# Patient Record
Sex: FEMALE | Race: BLACK OR AFRICAN AMERICAN | NOT HISPANIC OR LATINO
[De-identification: names, ages, dates, MRNs, and addresses within clinical notes are randomized per-mention and may not be internally consistent; named-entity substitution may affect disease eponyms.]

---

## 2017-04-11 ENCOUNTER — APPOINTMENT (OUTPATIENT)
Dept: ULTRASOUND IMAGING | Facility: IMAGING CENTER | Age: 72
End: 2017-04-11

## 2017-04-11 ENCOUNTER — OUTPATIENT (OUTPATIENT)
Dept: OUTPATIENT SERVICES | Facility: HOSPITAL | Age: 72
LOS: 1 days | End: 2017-04-11
Payer: MEDICARE

## 2017-04-11 ENCOUNTER — APPOINTMENT (OUTPATIENT)
Dept: MAMMOGRAPHY | Facility: IMAGING CENTER | Age: 72
End: 2017-04-11

## 2017-04-11 DIAGNOSIS — Z00.8 ENCOUNTER FOR OTHER GENERAL EXAMINATION: ICD-10-CM

## 2017-04-11 DIAGNOSIS — Z90.11 ACQUIRED ABSENCE OF RIGHT BREAST AND NIPPLE: Chronic | ICD-10-CM

## 2017-04-11 PROCEDURE — 76642 ULTRASOUND BREAST LIMITED: CPT

## 2017-04-11 PROCEDURE — 77065 DX MAMMO INCL CAD UNI: CPT

## 2017-04-11 PROCEDURE — G0279: CPT

## 2018-01-16 ENCOUNTER — INPATIENT (INPATIENT)
Facility: HOSPITAL | Age: 73
LOS: 2 days | Discharge: HOME | End: 2018-01-19
Attending: INTERNAL MEDICINE | Admitting: INTERNAL MEDICINE
Payer: MEDICARE

## 2018-01-16 VITALS
HEART RATE: 133 BPM | TEMPERATURE: 99 F | SYSTOLIC BLOOD PRESSURE: 120 MMHG | RESPIRATION RATE: 16 BRPM | DIASTOLIC BLOOD PRESSURE: 75 MMHG | OXYGEN SATURATION: 100 %

## 2018-01-16 DIAGNOSIS — I10 ESSENTIAL (PRIMARY) HYPERTENSION: ICD-10-CM

## 2018-01-16 DIAGNOSIS — Z29.9 ENCOUNTER FOR PROPHYLACTIC MEASURES, UNSPECIFIED: ICD-10-CM

## 2018-01-16 DIAGNOSIS — A41.9 SEPSIS, UNSPECIFIED ORGANISM: ICD-10-CM

## 2018-01-16 DIAGNOSIS — Z90.11 ACQUIRED ABSENCE OF RIGHT BREAST AND NIPPLE: Chronic | ICD-10-CM

## 2018-01-16 LAB
ALBUMIN SERPL ELPH-MCNC: 3.1 G/DL — LOW (ref 3.3–5)
ALP SERPL-CCNC: 394 U/L — HIGH (ref 40–120)
ALT FLD-CCNC: 46 U/L — HIGH (ref 4–33)
APPEARANCE UR: SIGNIFICANT CHANGE UP
AST SERPL-CCNC: 274 U/L — HIGH (ref 4–32)
B PERT DNA SPEC QL NAA+PROBE: SIGNIFICANT CHANGE UP
BACTERIA # UR AUTO: SIGNIFICANT CHANGE UP
BASE EXCESS BLDV CALC-SCNC: 1.3 MMOL/L — SIGNIFICANT CHANGE UP
BASOPHILS NFR BLD AUTO: 0.1 % — SIGNIFICANT CHANGE UP (ref 0–2)
BASOPHILS NFR SPEC: 0 % — SIGNIFICANT CHANGE UP (ref 0–2)
BILIRUB SERPL-MCNC: 1.8 MG/DL — HIGH (ref 0.2–1.2)
BILIRUB UR-MCNC: SIGNIFICANT CHANGE UP
BLOOD GAS VENOUS - CREATININE: 0.94 MG/DL — SIGNIFICANT CHANGE UP (ref 0.5–1.3)
BLOOD UR QL VISUAL: NEGATIVE — SIGNIFICANT CHANGE UP
BUN SERPL-MCNC: 18 MG/DL — SIGNIFICANT CHANGE UP (ref 7–23)
C PNEUM DNA SPEC QL NAA+PROBE: NOT DETECTED — SIGNIFICANT CHANGE UP
CALCIUM SERPL-MCNC: 9.6 MG/DL — SIGNIFICANT CHANGE UP (ref 8.4–10.5)
CHLORIDE BLDV-SCNC: 99 MMOL/L — SIGNIFICANT CHANGE UP (ref 96–108)
CHLORIDE SERPL-SCNC: 94 MMOL/L — LOW (ref 98–107)
CO2 SERPL-SCNC: 25 MMOL/L — SIGNIFICANT CHANGE UP (ref 22–31)
COLOR SPEC: YELLOW — SIGNIFICANT CHANGE UP
CREAT SERPL-MCNC: 1.03 MG/DL — SIGNIFICANT CHANGE UP (ref 0.5–1.3)
EOSINOPHIL # BLD AUTO: 0 K/UL — SIGNIFICANT CHANGE UP (ref 0–0.5)
EOSINOPHIL NFR BLD AUTO: 0 % — SIGNIFICANT CHANGE UP (ref 0–6)
EOSINOPHIL NFR FLD: 0 % — SIGNIFICANT CHANGE UP (ref 0–6)
FLUAV H1 2009 PAND RNA SPEC QL NAA+PROBE: NOT DETECTED — SIGNIFICANT CHANGE UP
FLUAV H1 RNA SPEC QL NAA+PROBE: NOT DETECTED — SIGNIFICANT CHANGE UP
FLUAV H3 RNA SPEC QL NAA+PROBE: NOT DETECTED — SIGNIFICANT CHANGE UP
FLUAV SUBTYP SPEC NAA+PROBE: SIGNIFICANT CHANGE UP
FLUBV RNA SPEC QL NAA+PROBE: NOT DETECTED — SIGNIFICANT CHANGE UP
GAS PNL BLDV: 127 MMOL/L — LOW (ref 136–146)
GIANT PLATELETS BLD QL SMEAR: PRESENT — SIGNIFICANT CHANGE UP
GLUCOSE BLDV-MCNC: 97 — SIGNIFICANT CHANGE UP (ref 70–99)
GLUCOSE SERPL-MCNC: 94 MG/DL — SIGNIFICANT CHANGE UP (ref 70–99)
GLUCOSE UR-MCNC: NEGATIVE — SIGNIFICANT CHANGE UP
HADV DNA SPEC QL NAA+PROBE: NOT DETECTED — SIGNIFICANT CHANGE UP
HCO3 BLDV-SCNC: 26 MMOL/L — SIGNIFICANT CHANGE UP (ref 20–27)
HCOV 229E RNA SPEC QL NAA+PROBE: NOT DETECTED — SIGNIFICANT CHANGE UP
HCOV HKU1 RNA SPEC QL NAA+PROBE: NOT DETECTED — SIGNIFICANT CHANGE UP
HCOV NL63 RNA SPEC QL NAA+PROBE: NOT DETECTED — SIGNIFICANT CHANGE UP
HCOV OC43 RNA SPEC QL NAA+PROBE: NOT DETECTED — SIGNIFICANT CHANGE UP
HCT VFR BLD CALC: 33.8 % — LOW (ref 34.5–45)
HCT VFR BLDV CALC: 35 % — SIGNIFICANT CHANGE UP (ref 34.5–45)
HGB BLD-MCNC: 11 G/DL — LOW (ref 11.5–15.5)
HGB BLDV-MCNC: 11.4 G/DL — LOW (ref 11.5–15.5)
HMPV RNA SPEC QL NAA+PROBE: NOT DETECTED — SIGNIFICANT CHANGE UP
HPIV1 RNA SPEC QL NAA+PROBE: NOT DETECTED — SIGNIFICANT CHANGE UP
HPIV2 RNA SPEC QL NAA+PROBE: NOT DETECTED — SIGNIFICANT CHANGE UP
HPIV3 RNA SPEC QL NAA+PROBE: NOT DETECTED — SIGNIFICANT CHANGE UP
HPIV4 RNA SPEC QL NAA+PROBE: NOT DETECTED — SIGNIFICANT CHANGE UP
HYALINE CASTS # UR AUTO: SIGNIFICANT CHANGE UP (ref 0–?)
HYPOCHROMIA BLD QL: SLIGHT — SIGNIFICANT CHANGE UP
IMM GRANULOCYTES # BLD AUTO: 0.31 # — SIGNIFICANT CHANGE UP
IMM GRANULOCYTES NFR BLD AUTO: 1.1 % — SIGNIFICANT CHANGE UP (ref 0–1.5)
KETONES UR-MCNC: SIGNIFICANT CHANGE UP
LACTATE BLDV-MCNC: 2 MMOL/L — SIGNIFICANT CHANGE UP (ref 0.5–2)
LEUKOCYTE ESTERASE UR-ACNC: HIGH
LYMPHOCYTES # BLD AUTO: 1.68 K/UL — SIGNIFICANT CHANGE UP (ref 1–3.3)
LYMPHOCYTES # BLD AUTO: 5.9 % — LOW (ref 13–44)
LYMPHOCYTES NFR SPEC AUTO: 4.5 % — LOW (ref 13–44)
M PNEUMO DNA SPEC QL NAA+PROBE: NOT DETECTED — SIGNIFICANT CHANGE UP
MCHC RBC-ENTMCNC: 26.3 PG — LOW (ref 27–34)
MCHC RBC-ENTMCNC: 32.5 % — SIGNIFICANT CHANGE UP (ref 32–36)
MCV RBC AUTO: 80.9 FL — SIGNIFICANT CHANGE UP (ref 80–100)
MICROCYTES BLD QL: SIGNIFICANT CHANGE UP
MONOCYTES # BLD AUTO: 2.27 K/UL — HIGH (ref 0–0.9)
MONOCYTES NFR BLD AUTO: 8 % — SIGNIFICANT CHANGE UP (ref 2–14)
MONOCYTES NFR BLD: 5.4 % — SIGNIFICANT CHANGE UP (ref 2–9)
MUCOUS THREADS # UR AUTO: SIGNIFICANT CHANGE UP
NEUTROPHIL AB SER-ACNC: 82.1 % — HIGH (ref 43–77)
NEUTROPHILS # BLD AUTO: 24.07 K/UL — HIGH (ref 1.8–7.4)
NEUTROPHILS NFR BLD AUTO: 84.9 % — HIGH (ref 43–77)
NEUTS BAND # BLD: 7.1 % — HIGH (ref 0–6)
NITRITE UR-MCNC: NEGATIVE — SIGNIFICANT CHANGE UP
PCO2 BLDV: 36 MMHG — LOW (ref 41–51)
PH BLDV: 7.46 PH — HIGH (ref 7.32–7.43)
PH UR: 6 — SIGNIFICANT CHANGE UP (ref 4.6–8)
PLATELET # BLD AUTO: 410 K/UL — HIGH (ref 150–400)
PLATELET COUNT - ESTIMATE: NORMAL — SIGNIFICANT CHANGE UP
PMV BLD: 9 FL — SIGNIFICANT CHANGE UP (ref 7–13)
PO2 BLDV: 51 MMHG — HIGH (ref 35–40)
POTASSIUM BLDV-SCNC: 4.1 MMOL/L — SIGNIFICANT CHANGE UP (ref 3.4–4.5)
POTASSIUM SERPL-MCNC: 4.2 MMOL/L — SIGNIFICANT CHANGE UP (ref 3.5–5.3)
POTASSIUM SERPL-SCNC: 4.2 MMOL/L — SIGNIFICANT CHANGE UP (ref 3.5–5.3)
PROT SERPL-MCNC: 8.1 G/DL — SIGNIFICANT CHANGE UP (ref 6–8.3)
PROT UR-MCNC: 100 MG/DL — HIGH
RBC # BLD: 4.18 M/UL — SIGNIFICANT CHANGE UP (ref 3.8–5.2)
RBC # FLD: 15.8 % — HIGH (ref 10.3–14.5)
RBC CASTS # UR COMP ASSIST: SIGNIFICANT CHANGE UP (ref 0–?)
RSV RNA SPEC QL NAA+PROBE: NOT DETECTED — SIGNIFICANT CHANGE UP
RV+EV RNA SPEC QL NAA+PROBE: NOT DETECTED — SIGNIFICANT CHANGE UP
SAO2 % BLDV: 84.6 % — SIGNIFICANT CHANGE UP (ref 60–85)
SODIUM SERPL-SCNC: 132 MMOL/L — LOW (ref 135–145)
SP GR SPEC: 1.02 — SIGNIFICANT CHANGE UP (ref 1–1.04)
SQUAMOUS # UR AUTO: SIGNIFICANT CHANGE UP
UROBILINOGEN FLD QL: 2 MG/DL — HIGH
VARIANT LYMPHS # BLD: 0.9 % — SIGNIFICANT CHANGE UP
WBC # BLD: 28.37 K/UL — HIGH (ref 3.8–10.5)
WBC # FLD AUTO: 28.37 K/UL — HIGH (ref 3.8–10.5)
WBC UR QL: HIGH (ref 0–?)

## 2018-01-16 PROCEDURE — 99223 1ST HOSP IP/OBS HIGH 75: CPT | Mod: GC

## 2018-01-16 PROCEDURE — 71046 X-RAY EXAM CHEST 2 VIEWS: CPT | Mod: 26

## 2018-01-16 PROCEDURE — 71250 CT THORAX DX C-: CPT | Mod: 26

## 2018-01-16 RX ORDER — AZITHROMYCIN 500 MG/1
500 TABLET, FILM COATED ORAL ONCE
Qty: 0 | Refills: 0 | Status: COMPLETED | OUTPATIENT
Start: 2018-01-16 | End: 2018-01-16

## 2018-01-16 RX ORDER — CEFTRIAXONE 500 MG/1
1 INJECTION, POWDER, FOR SOLUTION INTRAMUSCULAR; INTRAVENOUS EVERY 24 HOURS
Qty: 0 | Refills: 0 | Status: DISCONTINUED | OUTPATIENT
Start: 2018-01-16 | End: 2018-01-18

## 2018-01-16 RX ORDER — CEFTRIAXONE 500 MG/1
1 INJECTION, POWDER, FOR SOLUTION INTRAMUSCULAR; INTRAVENOUS ONCE
Qty: 0 | Refills: 0 | Status: COMPLETED | OUTPATIENT
Start: 2018-01-16 | End: 2018-01-16

## 2018-01-16 RX ORDER — ACETAMINOPHEN 500 MG
650 TABLET ORAL ONCE
Qty: 0 | Refills: 0 | Status: DISCONTINUED | OUTPATIENT
Start: 2018-01-16 | End: 2018-01-16

## 2018-01-16 RX ORDER — AZITHROMYCIN 500 MG/1
500 TABLET, FILM COATED ORAL EVERY 24 HOURS
Qty: 0 | Refills: 0 | Status: DISCONTINUED | OUTPATIENT
Start: 2018-01-16 | End: 2018-01-18

## 2018-01-16 RX ORDER — LISINOPRIL 2.5 MG/1
20 TABLET ORAL DAILY
Qty: 0 | Refills: 0 | Status: DISCONTINUED | OUTPATIENT
Start: 2018-01-16 | End: 2018-01-16

## 2018-01-16 RX ORDER — HEPARIN SODIUM 5000 [USP'U]/ML
5000 INJECTION INTRAVENOUS; SUBCUTANEOUS EVERY 8 HOURS
Qty: 0 | Refills: 0 | Status: DISCONTINUED | OUTPATIENT
Start: 2018-01-16 | End: 2018-01-19

## 2018-01-16 RX ORDER — ACETAMINOPHEN 500 MG
650 TABLET ORAL ONCE
Qty: 0 | Refills: 0 | Status: COMPLETED | OUTPATIENT
Start: 2018-01-16 | End: 2018-01-16

## 2018-01-16 RX ORDER — SODIUM CHLORIDE 9 MG/ML
2000 INJECTION INTRAMUSCULAR; INTRAVENOUS; SUBCUTANEOUS ONCE
Qty: 0 | Refills: 0 | Status: DISCONTINUED | OUTPATIENT
Start: 2018-01-16 | End: 2018-01-16

## 2018-01-16 RX ORDER — SODIUM CHLORIDE 9 MG/ML
1000 INJECTION INTRAMUSCULAR; INTRAVENOUS; SUBCUTANEOUS ONCE
Qty: 0 | Refills: 0 | Status: COMPLETED | OUTPATIENT
Start: 2018-01-16 | End: 2018-01-16

## 2018-01-16 RX ORDER — HYDROCHLOROTHIAZIDE 25 MG
25 TABLET ORAL DAILY
Qty: 0 | Refills: 0 | Status: DISCONTINUED | OUTPATIENT
Start: 2018-01-16 | End: 2018-01-16

## 2018-01-16 RX ADMIN — SODIUM CHLORIDE 1000 MILLILITER(S): 9 INJECTION INTRAMUSCULAR; INTRAVENOUS; SUBCUTANEOUS at 11:05

## 2018-01-16 RX ADMIN — Medication 650 MILLIGRAM(S): at 11:21

## 2018-01-16 RX ADMIN — HEPARIN SODIUM 5000 UNIT(S): 5000 INJECTION INTRAVENOUS; SUBCUTANEOUS at 22:18

## 2018-01-16 RX ADMIN — AZITHROMYCIN 250 MILLIGRAM(S): 500 TABLET, FILM COATED ORAL at 13:03

## 2018-01-16 RX ADMIN — CEFTRIAXONE 100 GRAM(S): 500 INJECTION, POWDER, FOR SOLUTION INTRAMUSCULAR; INTRAVENOUS at 11:33

## 2018-01-16 NOTE — H&P ADULT - PROBLEM SELECTOR PLAN 2
Continue lisinopril and HCTZ (home dose)  Diltiazem 60 mg Q8H (takes 180 mg daily slow release at home)

## 2018-01-16 NOTE — H&P ADULT - NSHPPHYSICALEXAM_GEN_ALL_CORE
PHYSICAL EXAM:    Constitutional: No distress, thin  Neck: Supple  Respiratory: CTAB, no wheezing, no use of acc muscles  Cardiovascular: RRR, no murmur, no edema  Gastrointestinal: Soft, nontender  Neurological: A&Ox3, no focal deficits  Skin: Intact  Musculoskeletal: Normal ROM  Psychiatric: Calm, pleasant

## 2018-01-16 NOTE — ED PROVIDER NOTE - MEDICAL DECISION MAKING DETAILS
72F presenting with cough x 4mo with weight loss and night sweat, remote hx of breast CA. DDX: Lung mets vs. PE vs. PNA vs. Reactive airway vs. GERD vs. Allergy. Tachycardia maybe from dehydration. Will check Basics, Gas, NS, Rectal temp, CTA to r/o lung mets/PE.

## 2018-01-16 NOTE — H&P ADULT - ASSESSMENT
72 year old female with hypertension presents with cough, dyspnea, weight loss. She is septic with bandemia. Overall looks nontoxic. Not requiring oxygen at this time. The time course of her symptoms not consistent however with acute infection. Also concerned with 10 lbs weight loss over past four months. Will treat for community acquired pneumonia for now. Should have malignancy work up after acute infection resolves.

## 2018-01-16 NOTE — H&P ADULT - HISTORY OF PRESENT ILLNESS
72 year old female with history of breast cancer s/p mastectomy (2015) and hypertension presents with approximately four months of productive cough with clear sputum and dyspnea. Prior to onset was feeling well, which is her baseline. Patient is normally active, exercises multiple times per week. At time of onset, patient also noted myalgias, fatigue Symptoms initially improved and then worsened. She went to an urgent care center and prescribed abx (unclear which type). Also seen by her PMD. Reports around 10 lbs weight loss due to loss of appetite. Lives at home with daughter and granddaughter. No sick contacts. No chest pain, palpitations, syncope, diarrhea, nausea, melena, or vomiting. No known pulmonary disease. Former smoker (quite > 30 years ago). Given azithromycin and ceftriaxone in ED. Not requiring O2 at this time.

## 2018-01-16 NOTE — H&P ADULT - PROBLEM SELECTOR PLAN 1
Treat for community acquired pneumonia  Continue ceftriaxone and azithromycin  Follow up urinary legionella and cultures

## 2018-01-16 NOTE — ED PROVIDER NOTE - OBJECTIVE STATEMENT
72F presenting with cough and SOB x 4 months. Pt visited urgent s/p Abx tx in Oct, and also visit PMD visit, told it was allergy. Pt endorsed to SOB with cough for past few weeks. Has weight loss (10lb) with night sweats. Loss of appetite with decreased PO intake. No fever or chills. No hx of blood clots, pain in calf, long travel or recent surgery. Pt came today because she had worsening of cough last night with productive clear sputum/ no blood. No chest pain/back pain or abd pain. No nausea or vomiting or diarrhea. 72F presenting with cough and SOB x 4 months. Pt visited urgent s/p Abx tx in Oct, and also visit PMD visit, told it was allergy. Pt endorsed to SOB with cough for past few weeks. Has weight loss (10lb) with night sweats. Loss of appetite with decreased PO intake. No fever or chills. No hx of blood clots, pain in calf, long travel or recent surgery. Pt came today because she had worsening of cough last night with productive clear sputum/ no blood. No chest pain/back pain or abd pain. No nausea or vomiting or diarrhea.  PMD: Dr. Flowers ([employer])

## 2018-01-16 NOTE — ED ADULT NURSE NOTE - OBJECTIVE STATEMENT
Patient presented to ED A&O x4, with c/o SOB and cough for several months with decreased appetite and weight loss of 10 lbs.  Patient denies any CP or fever, (+) rectal temp in ED.  EKG done and cardiac monitor in place.  Blood work drawn and urine specimen obtained and sent to lab. ER physician evaluated patient, orders placed for tx.  Will continue to monitor patient closely. Janice LOUIE.

## 2018-01-16 NOTE — ED ADULT NURSE NOTE - NS ED NURSE PATIENT LEFT UNIT TIME
14:45

## 2018-01-16 NOTE — ED ADULT TRIAGE NOTE - CHIEF COMPLAINT QUOTE
c/o coughing with white sputum, pt also pain to bilat upper quadrant pain worsens with cough. pt sts been feeing like this since october and has been seeing her PMD with no improvement. denies any fevers at home.

## 2018-01-16 NOTE — ED PROVIDER NOTE - ATTENDING CONTRIBUTION TO CARE
72f, pmhx htn, remote breast ca. p/w 6 weeks of cough. cough worsened last night with increased sputum last night so came to ED. no c/p, sob, n/v, f/c. +mild night sweats. with initial cough didn't eat much but since sidney her appetite and po intake has been normal. no new rhinorrhea, sore throat, body aches or h/a. exam, tachy 124, febrile rectal 102 other vs wnl, nad, no resp distress. hs normal, lungs clear, abdo benign, pulses normal. likely pna, will get labs, cxr, treat as cap, rvp, IVF. admit.

## 2018-01-16 NOTE — H&P ADULT - NSHPLABSRESULTS_GEN_ALL_CORE
11.0   28.37 )-----------( 410      ( 16 Jan 2018 11:00 )             33.8     01-16    132<L>  |  94<L>  |  18  ----------------------------<  94  4.2   |  25  |  1.03    Ca    9.6      16 Jan 2018 10:58    TPro  8.1  /  Alb  3.1<L>  /  TBili  1.8<H>  /  DBili  x   /  AST  274<H>  /  ALT  46<H>  /  AlkPhos  394<H>  01-16      Xray Chest 2 Views PA/Lat (01.16.18 @ 12:10) >    Slight right hemidiaphragm elevation.    Small focus of linear subsegmental atelectasis or scarring in left lung   base. Clear remaining visualized lungs. No pleural effusions or   pneumothorax.    Scant aortic arch calcifications. Otherwise cardiac and mediastinal   silhouettes within normal limits.    Trachea midline.    Generalized mild osteopenia. Mild spinal degenerative changes.    Asymmetric breast shadows with right appearing more receded and with   appearance of a right chest wall breast implant.

## 2018-01-17 DIAGNOSIS — K76.9 LIVER DISEASE, UNSPECIFIED: ICD-10-CM

## 2018-01-17 DIAGNOSIS — R06.02 SHORTNESS OF BREATH: ICD-10-CM

## 2018-01-17 LAB
BASOPHILS # BLD AUTO: 0.03 K/UL — SIGNIFICANT CHANGE UP (ref 0–0.2)
BASOPHILS NFR BLD AUTO: 0.1 % — SIGNIFICANT CHANGE UP (ref 0–2)
BUN SERPL-MCNC: 26 MG/DL — HIGH (ref 7–23)
CALCIUM SERPL-MCNC: 8.9 MG/DL — SIGNIFICANT CHANGE UP (ref 8.4–10.5)
CHLORIDE SERPL-SCNC: 93 MMOL/L — LOW (ref 98–107)
CO2 SERPL-SCNC: 25 MMOL/L — SIGNIFICANT CHANGE UP (ref 22–31)
CREAT SERPL-MCNC: 0.99 MG/DL — SIGNIFICANT CHANGE UP (ref 0.5–1.3)
EOSINOPHIL # BLD AUTO: 0.01 K/UL — SIGNIFICANT CHANGE UP (ref 0–0.5)
EOSINOPHIL NFR BLD AUTO: 0 % — SIGNIFICANT CHANGE UP (ref 0–6)
GLUCOSE SERPL-MCNC: 114 MG/DL — HIGH (ref 70–99)
HCT VFR BLD CALC: 29 % — LOW (ref 34.5–45)
HGB BLD-MCNC: 9.2 G/DL — LOW (ref 11.5–15.5)
IMM GRANULOCYTES # BLD AUTO: 0.2 # — SIGNIFICANT CHANGE UP
IMM GRANULOCYTES NFR BLD AUTO: 1 % — SIGNIFICANT CHANGE UP (ref 0–1.5)
LYMPHOCYTES # BLD AUTO: 2.05 K/UL — SIGNIFICANT CHANGE UP (ref 1–3.3)
LYMPHOCYTES # BLD AUTO: 9.8 % — LOW (ref 13–44)
MAGNESIUM SERPL-MCNC: 1.8 MG/DL — SIGNIFICANT CHANGE UP (ref 1.6–2.6)
MCHC RBC-ENTMCNC: 25.7 PG — LOW (ref 27–34)
MCHC RBC-ENTMCNC: 31.7 % — LOW (ref 32–36)
MCV RBC AUTO: 81 FL — SIGNIFICANT CHANGE UP (ref 80–100)
MONOCYTES # BLD AUTO: 2.07 K/UL — HIGH (ref 0–0.9)
MONOCYTES NFR BLD AUTO: 9.9 % — SIGNIFICANT CHANGE UP (ref 2–14)
NEUTROPHILS # BLD AUTO: 16.55 K/UL — HIGH (ref 1.8–7.4)
NEUTROPHILS NFR BLD AUTO: 79.2 % — HIGH (ref 43–77)
NRBC # FLD: 0 — SIGNIFICANT CHANGE UP
PHOSPHATE SERPL-MCNC: 3.2 MG/DL — SIGNIFICANT CHANGE UP (ref 2.5–4.5)
PLATELET # BLD AUTO: 347 K/UL — SIGNIFICANT CHANGE UP (ref 150–400)
PMV BLD: 9.8 FL — SIGNIFICANT CHANGE UP (ref 7–13)
POTASSIUM SERPL-MCNC: 4.8 MMOL/L — SIGNIFICANT CHANGE UP (ref 3.5–5.3)
POTASSIUM SERPL-SCNC: 4.8 MMOL/L — SIGNIFICANT CHANGE UP (ref 3.5–5.3)
RBC # BLD: 3.58 M/UL — LOW (ref 3.8–5.2)
RBC # FLD: 15.7 % — HIGH (ref 10.3–14.5)
SODIUM SERPL-SCNC: 130 MMOL/L — LOW (ref 135–145)
SPECIMEN SOURCE: SIGNIFICANT CHANGE UP
WBC # BLD: 20.91 K/UL — HIGH (ref 3.8–10.5)
WBC # FLD AUTO: 20.91 K/UL — HIGH (ref 3.8–10.5)

## 2018-01-17 PROCEDURE — 93010 ELECTROCARDIOGRAM REPORT: CPT

## 2018-01-17 PROCEDURE — 99233 SBSQ HOSP IP/OBS HIGH 50: CPT | Mod: GC

## 2018-01-17 RX ORDER — IBUPROFEN 200 MG
400 TABLET ORAL
Qty: 0 | Refills: 0 | Status: DISCONTINUED | OUTPATIENT
Start: 2018-01-17 | End: 2018-01-19

## 2018-01-17 RX ORDER — CHLORHEXIDINE GLUCONATE 213 G/1000ML
1 SOLUTION TOPICAL DAILY
Qty: 0 | Refills: 0 | Status: DISCONTINUED | OUTPATIENT
Start: 2018-01-17 | End: 2018-01-19

## 2018-01-17 RX ORDER — SODIUM CHLORIDE 9 MG/ML
1000 INJECTION INTRAMUSCULAR; INTRAVENOUS; SUBCUTANEOUS
Qty: 0 | Refills: 0 | Status: DISCONTINUED | OUTPATIENT
Start: 2018-01-17 | End: 2018-01-19

## 2018-01-17 RX ADMIN — Medication 400 MILLIGRAM(S): at 22:15

## 2018-01-17 RX ADMIN — Medication 400 MILLIGRAM(S): at 21:45

## 2018-01-17 RX ADMIN — AZITHROMYCIN 250 MILLIGRAM(S): 500 TABLET, FILM COATED ORAL at 13:00

## 2018-01-17 RX ADMIN — HEPARIN SODIUM 5000 UNIT(S): 5000 INJECTION INTRAVENOUS; SUBCUTANEOUS at 05:42

## 2018-01-17 RX ADMIN — HEPARIN SODIUM 5000 UNIT(S): 5000 INJECTION INTRAVENOUS; SUBCUTANEOUS at 13:00

## 2018-01-17 RX ADMIN — SODIUM CHLORIDE 250 MILLILITER(S): 9 INJECTION INTRAMUSCULAR; INTRAVENOUS; SUBCUTANEOUS at 14:01

## 2018-01-17 RX ADMIN — HEPARIN SODIUM 5000 UNIT(S): 5000 INJECTION INTRAVENOUS; SUBCUTANEOUS at 21:45

## 2018-01-17 RX ADMIN — Medication 200 MILLIGRAM(S): at 21:56

## 2018-01-17 RX ADMIN — CEFTRIAXONE 100 GRAM(S): 500 INJECTION, POWDER, FOR SOLUTION INTRAMUSCULAR; INTRAVENOUS at 11:22

## 2018-01-17 NOTE — CHART NOTE - NSCHARTNOTEFT_GEN_A_CORE
Called Dr. Montemayor's office at [phone] for collateral information re: pt's breast cancer. Spoke with Dr. Goodwin, as Dr. Montemayor is not in office until Friday. Dr. Goodwin is not familiar with the patient, but was able to read some notes.  According to Dr. Goodwin, pt had right mastectomy in 2003. Pt has been with known liver mets since 2014. Was last on Kadcylla in 2015, but off chemo at this time. States there have not been any recent CT or PET scans. States he does not know the patient and cannot add any context - will try to reach out to Dr. Montemayor again when she is in on Friday if necessary.  Spoke with patient - patient states she is no longer on any chemo medications but when questioned about her liver lesions since 2014 pt states she was told she had elevated liver enzymes. Pt is not aware of any liver lesions. States she sees Dr. Montemayor for her breast cancer and follows her for that. States she has an appointment this Friday to see her.  Pt is pending CT A/P for further eval.

## 2018-01-17 NOTE — PROGRESS NOTE ADULT - SUBJECTIVE AND OBJECTIVE BOX
CHIEF COMPLAINT: Patient is a 72y old  Female who presents with a chief complaint of Cough and SOB x six weeks (2018 14:05)    Interval Events:      REVIEW OF SYSTEMS:  Constitutional:   Eyes:  ENT:  CV:  Resp:  GI:  :  MSK:  Integumentary:  Neurological:  Psychiatric:  Endocrine:  Hematologic/Lymphatic:  Allergic/Immunologic:  [ ] All other systems negative  [ ] Unable to assess ROS because ________      OBJECTIVE:  ICU Vital Signs Last 24 Hrs  T(C): 37.1 (2018 05:41), Max: 39 (2018 11:07)  T(F): 98.8 (2018 05:41), Max: 102.2 (2018 11:07)  HR: 100 (2018 05:41) (82 - 133)  BP: 107/70 (2018 05:41) (106/63 - 126/69)  BP(mean): --  ABP: --  ABP(mean): --  RR: 18 (2018 05:41) (16 - 28)  SpO2: 100% (2018 05:41) (100% - 100%)     @ 07:01  -  -17 @ 07:00  --------------------------------------------------------  IN: 0 mL / OUT: 1400 mL / NET: -1400 mL      HOSPITAL MEDICATIONS:  MEDICATIONS  (STANDING):  azithromycin  IVPB 500 milliGRAM(s) IV Intermittent every 24 hours  cefTRIAXone   IVPB 1 Gram(s) IV Intermittent every 24 hours  heparin  Injectable 5000 Unit(s) SubCutaneous every 8 hours    MEDICATIONS  (PRN):      LABS:                        9.2    20.91 )-----------( 347      ( 2018 06:00 )             29.0         130<L>  |  93<L>  |  26<H>  ----------------------------<  114<H>  4.8   |  25  |  0.99    Ca    8.9      2018 06:00  Phos  3.2     -17  Mg     1.8         TPro  8.1  /  Alb  3.1<L>  /  TBili  1.8<H>  /  DBili  x   /  AST  274<H>  /  ALT  46<H>  /  AlkPhos  394<H>        Urinalysis Basic - ( 2018 11:00 )    Color: YELLOW / Appearance: HAZY / S.022 / pH: 6.0  Gluc: NEGATIVE / Ketone: TRACE  / Bili: SMALL / Urobili: 2 mg/dL   Blood: NEGATIVE / Protein: 100 mg/dL / Nitrite: NEGATIVE   Leuk Esterase: SMALL / RBC: 0-2 / WBC 5-10   Sq Epi: MOD / Non Sq Epi: x / Bacteria: FEW        Venous Blood Gas:   @ 10:58  7.46/36/51/26/84.6  VBG Lactate: 2.0      MICROBIOLOGY:     RADIOLOGY:  [ ] Reviewed and interpreted by me    PULMONARY FUNCTION TESTS:    EKG: CHIEF COMPLAINT: Patient is a 72y old  Female who presents with a chief complaint of Cough and SOB x six weeks (16 Jan 2018 14:05)    Interval Events: none overnight      REVIEW OF SYSTEMS:  Constitutional: feels much better  CV: denies  Resp: denies  GI: denies  [x] All other systems negative  [ ] Unable to assess ROS because ________      OBJECTIVE:  ICU Vital Signs Last 24 Hrs  T(C): 37.1 (17 Jan 2018 05:41), Max: 39 (16 Jan 2018 11:07)  T(F): 98.8 (17 Jan 2018 05:41), Max: 102.2 (16 Jan 2018 11:07)  HR: 100 (17 Jan 2018 05:41) (82 - 133)  BP: 107/70 (17 Jan 2018 05:41) (106/63 - 126/69)  BP(mean): --  ABP: --  ABP(mean): --  RR: 18 (17 Jan 2018 05:41) (16 - 28)  SpO2: 100% (17 Jan 2018 05:41) (100% - 100%)    01-16 @ 07:01  -  01-17 @ 07:00  --------------------------------------------------------  IN: 0 mL / OUT: 1400 mL / NET: -1400 mL      HOSPITAL MEDICATIONS:  MEDICATIONS  (STANDING):  azithromycin  IVPB 500 milliGRAM(s) IV Intermittent every 24 hours  cefTRIAXone   IVPB 1 Gram(s) IV Intermittent every 24 hours  heparin  Injectable 5000 Unit(s) SubCutaneous every 8 hours    MEDICATIONS  (PRN):      LABS:                        9.2    20.91 )-----------( 347      ( 17 Jan 2018 06:00 )             29.0     01-17    130<L>  |  93<L>  |  26<H>  ----------------------------<  114<H>  4.8   |  25  |  0.99    Ca    8.9      17 Jan 2018 06:00  Phos  3.2     01-17  Mg     1.8     01-17

## 2018-01-17 NOTE — CHART NOTE - NSCHARTNOTEFT_GEN_A_CORE
ADS    Called to evaluate this 72 year old female patient who is here for productive cough with clear sputum and dyspnea for a .  STAT EKG ordered. ADS    Called to evaluate this 72 year old female patient who is here for productive cough with clear sputum and dyspnea for a .  STAT EKG ordered.  Pt denied chest pain, -SOB.  Pt in no acute distress. EKG - sinus tachycardic.  Will continue to monitor.     1/18 - 0030 -  . Pt sleeping and resting comfortably in no distress.      Mari MIRELES ACNP

## 2018-01-18 ENCOUNTER — TRANSCRIPTION ENCOUNTER (OUTPATIENT)
Age: 73
End: 2018-01-18

## 2018-01-18 LAB
BACTERIA UR CULT: SIGNIFICANT CHANGE UP
BUN SERPL-MCNC: 31 MG/DL — HIGH (ref 7–23)
CALCIUM SERPL-MCNC: 8.9 MG/DL — SIGNIFICANT CHANGE UP (ref 8.4–10.5)
CHLORIDE SERPL-SCNC: 94 MMOL/L — LOW (ref 98–107)
CO2 SERPL-SCNC: 19 MMOL/L — LOW (ref 22–31)
CREAT SERPL-MCNC: 0.98 MG/DL — SIGNIFICANT CHANGE UP (ref 0.5–1.3)
GLUCOSE SERPL-MCNC: 73 MG/DL — SIGNIFICANT CHANGE UP (ref 70–99)
HCT VFR BLD CALC: 28.9 % — LOW (ref 34.5–45)
HGB BLD-MCNC: 9.7 G/DL — LOW (ref 11.5–15.5)
L PNEUMO AG UR QL: NEGATIVE — SIGNIFICANT CHANGE UP
MCHC RBC-ENTMCNC: 27.2 PG — SIGNIFICANT CHANGE UP (ref 27–34)
MCHC RBC-ENTMCNC: 33.6 % — SIGNIFICANT CHANGE UP (ref 32–36)
MCV RBC AUTO: 81.2 FL — SIGNIFICANT CHANGE UP (ref 80–100)
NRBC # FLD: 0 — SIGNIFICANT CHANGE UP
PLATELET # BLD AUTO: 335 K/UL — SIGNIFICANT CHANGE UP (ref 150–400)
PMV BLD: 10 FL — SIGNIFICANT CHANGE UP (ref 7–13)
POTASSIUM SERPL-MCNC: 5.2 MMOL/L — SIGNIFICANT CHANGE UP (ref 3.5–5.3)
POTASSIUM SERPL-SCNC: 5.2 MMOL/L — SIGNIFICANT CHANGE UP (ref 3.5–5.3)
RBC # BLD: 3.56 M/UL — LOW (ref 3.8–5.2)
RBC # FLD: 15.9 % — HIGH (ref 10.3–14.5)
SODIUM SERPL-SCNC: 127 MMOL/L — LOW (ref 135–145)
WBC # BLD: 21.23 K/UL — HIGH (ref 3.8–10.5)
WBC # FLD AUTO: 21.23 K/UL — HIGH (ref 3.8–10.5)

## 2018-01-18 PROCEDURE — 93970 EXTREMITY STUDY: CPT | Mod: 26

## 2018-01-18 PROCEDURE — 93010 ELECTROCARDIOGRAM REPORT: CPT

## 2018-01-18 PROCEDURE — 74177 CT ABD & PELVIS W/CONTRAST: CPT | Mod: 26

## 2018-01-18 PROCEDURE — 99233 SBSQ HOSP IP/OBS HIGH 50: CPT | Mod: GC

## 2018-01-18 RX ORDER — SODIUM CHLORIDE 9 MG/ML
1000 INJECTION INTRAMUSCULAR; INTRAVENOUS; SUBCUTANEOUS
Qty: 0 | Refills: 0 | Status: DISCONTINUED | OUTPATIENT
Start: 2018-01-18 | End: 2018-01-19

## 2018-01-18 RX ORDER — IBUPROFEN 200 MG
1 TABLET ORAL
Qty: 0 | Refills: 0 | COMMUNITY
Start: 2018-01-18

## 2018-01-18 RX ADMIN — HEPARIN SODIUM 5000 UNIT(S): 5000 INJECTION INTRAVENOUS; SUBCUTANEOUS at 05:59

## 2018-01-18 RX ADMIN — CEFTRIAXONE 100 GRAM(S): 500 INJECTION, POWDER, FOR SOLUTION INTRAMUSCULAR; INTRAVENOUS at 11:50

## 2018-01-18 RX ADMIN — HEPARIN SODIUM 5000 UNIT(S): 5000 INJECTION INTRAVENOUS; SUBCUTANEOUS at 22:09

## 2018-01-18 RX ADMIN — CHLORHEXIDINE GLUCONATE 1 APPLICATION(S): 213 SOLUTION TOPICAL at 11:50

## 2018-01-18 RX ADMIN — Medication 200 MILLIGRAM(S): at 11:50

## 2018-01-18 RX ADMIN — Medication 400 MILLIGRAM(S): at 22:38

## 2018-01-18 RX ADMIN — SODIUM CHLORIDE 250 MILLILITER(S): 9 INJECTION INTRAMUSCULAR; INTRAVENOUS; SUBCUTANEOUS at 12:54

## 2018-01-18 RX ADMIN — HEPARIN SODIUM 5000 UNIT(S): 5000 INJECTION INTRAVENOUS; SUBCUTANEOUS at 13:00

## 2018-01-18 RX ADMIN — Medication 400 MILLIGRAM(S): at 22:08

## 2018-01-18 RX ADMIN — Medication 200 MILLIGRAM(S): at 22:08

## 2018-01-18 NOTE — DISCHARGE NOTE ADULT - PLAN OF CARE
resolved You were treated with antibiotics while in the hospital.  Please monitor for any further shortness of breath. Please continue follow up with Dr. Montemayor for further treatment and management.  You will likely need further scans to reassess the lesions of the liver, likely metastatic disease. Blood pressure stable off medications.

## 2018-01-18 NOTE — DISCHARGE NOTE ADULT - MEDICATION SUMMARY - MEDICATIONS TO STOP TAKING
I will STOP taking the medications listed below when I get home from the hospital:    hydrochlorothiazide 25 mg oral tablet  -- 1 tab(s) by mouth once a day    lisinopril-hydroCHLOROthiazide 20 mg-25 mg oral tablet  -- 1 tab(s) by mouth once a day

## 2018-01-18 NOTE — CHART NOTE - NSCHARTNOTEFT_GEN_A_CORE
ADS    Called to evaluate this patient for .  EKG - ST.  Pt asymptomatic.  Pt denies chest pain, palpitations, SOB.  Will continue to monitor.      Mari MIRELES NP

## 2018-01-18 NOTE — DISCHARGE NOTE ADULT - CARE PLAN
Principal Discharge DX:	Shortness of breath  Goal:	resolved  Assessment and plan of treatment:	You were treated with antibiotics while in the hospital.  Please monitor for any further shortness of breath.  Secondary Diagnosis:	Breast cancer  Assessment and plan of treatment:	Please continue follow up with Dr. Montemayor for further treatment and management.  You will likely need further scans to reassess the lesions of the liver, likely metastatic disease.  Secondary Diagnosis:	Hypertension  Assessment and plan of treatment:	Blood pressure stable off medications.

## 2018-01-18 NOTE — DISCHARGE NOTE ADULT - HOSPITAL COURSE
72 year old female with history of breast cancer s/p mastectomy (2015) and hypertension presents with approximately four months of productive cough with clear sputum and dyspnea.  CT chest without pna, but pt improved with abx.  Blood cultures, urine cx negative.  CT chest showing liver lesions - CT A/P showing extensive hepatic mets. Per pt's private oncologist, with known mets.  Pt's SOB improved, stable on room air.  Optimized for discharge.  Will follow up outpatient with own oncologist.    dispo: to home

## 2018-01-18 NOTE — DISCHARGE NOTE ADULT - CARE PROVIDER_API CALL
Geetha Montemayor), Hematology; Internal Medicine  [address] [facility]  [address] [place] [address]  Phone: [phone]  Fax: [phone]

## 2018-01-18 NOTE — PROGRESS NOTE ADULT - PROBLEM SELECTOR PLAN 3
- pt with hx of breast cancer, in 2003 per patient, s/p mastectomy, without chemo/radiation  - CT chest with possible liver lesions  - CT A/P pending final read  - see note from yesterday re: conversation with pt's oncologist
- pt with hx of breast cancer, in 2003 per patient, s/p mastectomy, without chemo/radiation  - CT chest with possible liver lesions  - will check CT A/P with and without IV contrast  - will reach out to pt's oncologist, Dr. Montemayor

## 2018-01-18 NOTE — PROGRESS NOTE ADULT - PROBLEM SELECTOR PLAN 1
Notes bulge in RLQ. Exam c/w probable right inguinal hernia. Advised to monitor, and consider referral to general surgery after delivery if persistent. Ultrasound c/w 1.6 cm left cystic structure, ?arising from left kidney. Questionable echogenic bowel does not appear as bright bone. Incomplete heart views. Pt aware low lying placenta. Normal quad screen. Will refer to [facility] to evaluate. Pt and  advised.   
Patient presents for her routine OB visit with anatomy    Patient would like communication of their results via:      Pablo  Patient's current [facility] status: Active.      
- now improved  - stable on room air  - cont to treat for CAP  - cont abx  - legionella pending  - urine culture NTD  - blood culture NTD
- now improved  - stable on room air  - cont to treat for CAP  - cont abx  - legionella pending  - urine culture NTD  - blood culture NTD

## 2018-01-18 NOTE — PROGRESS NOTE ADULT - SUBJECTIVE AND OBJECTIVE BOX
CHIEF COMPLAINT:    Interval Events:    REVIEW OF SYSTEMS:  Constitutional:   Eyes:  ENT:  CV:  Resp:  GI:  :  MSK:  Integumentary:  Neurological:  Psychiatric:  Endocrine:  Hematologic/Lymphatic:  Allergic/Immunologic:  [ ] All other systems negative  [ ] Unable to assess ROS because ________    OBJECTIVE:  ICU Vital Signs Last 24 Hrs  T(C): 36.6 (2018 05:50), Max: 37.5 (2018 21:31)  T(F): 97.9 (2018 05:50), Max: 99.5 (2018 21:31)  HR: 86 (2018 05:50) (86 - 122)  BP: 103/68 (2018 05:50) (103/68 - 120/71)  BP(mean): --  ABP: --  ABP(mean): --  RR: 18 (2018 05:50) (18 - 20)  SpO2: 100% (2018 05:50) (100% - 100%)        CAPILLARY BLOOD GLUCOSE          PHYSICAL EXAM:  General:   HEENT:   Lymph Nodes:  Neck:   Respiratory:   Cardiovascular:   Abdomen:   Extremities:   Skin:   Neurological:  Psychiatry:    HOSPITAL MEDICATIONS:  MEDICATIONS  (STANDING):  azithromycin  IVPB 500 milliGRAM(s) IV Intermittent every 24 hours  cefTRIAXone   IVPB 1 Gram(s) IV Intermittent every 24 hours  chlorhexidine 4% Liquid 1 Application(s) Topical daily  heparin  Injectable 5000 Unit(s) SubCutaneous every 8 hours  sodium chloride 0.9%. 1000 milliLiter(s) (250 mL/Hr) IV Continuous <Continuous>    MEDICATIONS  (PRN):  guaiFENesin    Syrup 200 milliGRAM(s) Oral every 6 hours PRN Cough  ibuprofen  Tablet 400 milliGRAM(s) Oral four times a day PRN moderate and severe pain      LABS:                        9.2    20.91 )-----------( 347      ( 2018 06:00 )             29.0     01-18    127<L>  |  94<L>  |  31<H>  ----------------------------<  73  5.2   |  19<L>  |  0.98    Ca    8.9      2018 06:45  Phos  3.2     -17  Mg     1.8     01-17    TPro  8.1  /  Alb  3.1<L>  /  TBili  1.8<H>  /  DBili  x   /  AST  274<H>  /  ALT  46<H>  /  AlkPhos  394<H>        Urinalysis Basic - ( 2018 11:00 )    Color: YELLOW / Appearance: HAZY / S.022 / pH: 6.0  Gluc: NEGATIVE / Ketone: TRACE  / Bili: SMALL / Urobili: 2 mg/dL   Blood: NEGATIVE / Protein: 100 mg/dL / Nitrite: NEGATIVE   Leuk Esterase: SMALL / RBC: 0-2 / WBC 5-10   Sq Epi: MOD / Non Sq Epi: x / Bacteria: FEW        Venous Blood Gas:   @ 10:58  7.46/36/51/26/84.6  VBG Lactate: 2.0      MICROBIOLOGY:     RADIOLOGY:  [ ] Reviewed and interpreted by me    PULMONARY FUNCTION TESTS:    EKG: CHIEF COMPLAINT: Patient is a 72y old  Female who presents with a chief complaint of Cough and SOB x six weeks (16 Jan 2018 14:05)    Interval Events: none overnight      REVIEW OF SYSTEMS:  Constitutional: no complaints  CV: denies  Resp: denies  GI: denies  [x] All other systems negative  [ ] Unable to assess ROS because ________      OBJECTIVE:  ICU Vital Signs Last 24 Hrs  T(C): 36.6 (18 Jan 2018 05:50), Max: 37.5 (17 Jan 2018 21:31)  T(F): 97.9 (18 Jan 2018 05:50), Max: 99.5 (17 Jan 2018 21:31)  HR: 86 (18 Jan 2018 05:50) (86 - 122)  BP: 103/68 (18 Jan 2018 05:50) (103/68 - 120/71)  BP(mean): --  ABP: --  ABP(mean): --  RR: 18 (18 Jan 2018 05:50) (18 - 20)  SpO2: 100% (18 Jan 2018 05:50) (100% - 100%)      HOSPITAL MEDICATIONS:  MEDICATIONS  (STANDING):  azithromycin  IVPB 500 milliGRAM(s) IV Intermittent every 24 hours  cefTRIAXone   IVPB 1 Gram(s) IV Intermittent every 24 hours  chlorhexidine 4% Liquid 1 Application(s) Topical daily  heparin  Injectable 5000 Unit(s) SubCutaneous every 8 hours  sodium chloride 0.9%. 1000 milliLiter(s) (250 mL/Hr) IV Continuous <Continuous>    MEDICATIONS  (PRN):  guaiFENesin    Syrup 200 milliGRAM(s) Oral every 6 hours PRN Cough  ibuprofen  Tablet 400 milliGRAM(s) Oral four times a day PRN moderate and severe pain      LABS:             Complete Blood Count (01.18.18 @ 08:35)    WBC Count: 21.23 K/uL    RBC Count: 3.56 M/uL    Hemoglobin: 9.7 g/dL    Hematocrit: 28.9 %    Mean Cell Volume: 81.2 fL    Mean Cell Hemoglobin: 27.2 pg    Mean Cell Hemoglobin Conc: 33.6 %    Red Cell Distrib Width: 15.9 %    Platelet Count - Automated: 335 K/uL    MPV: 10.0 fl    Nucleated RBC #: 0      01-18    127<L>  |  94<L>  |  31<H>  ----------------------------<  73  5.2   |  19<L>  |  0.98    Ca    8.9      18 Jan 2018 06:45

## 2018-01-18 NOTE — PROVIDER CONTACT NOTE (OTHER) - SITUATION
Patient HR is 124. Complaining of pain in general chest area only when coughing.
Patient's HR is 122, other VS stable.
hr  115, temp 98, bp 107/65
hr  127

## 2018-01-18 NOTE — PROVIDER CONTACT NOTE (OTHER) - ASSESSMENT
, /68
  /71  T 99.5F  O2 100%  RR 20
pt alert, no sign of distress noted. Pt denies pain
pt alert, no sign of distress noted. Pt denies pain

## 2018-01-18 NOTE — PROGRESS NOTE ADULT - NEUROLOGICAL DETAILS
responds to pain/responds to verbal commands/alert and oriented x 3
responds to pain/alert and oriented x 3/responds to verbal commands

## 2018-01-18 NOTE — DISCHARGE NOTE ADULT - MEDICATION SUMMARY - MEDICATIONS TO TAKE
I will START or STAY ON the medications listed below when I get home from the hospital:    ibuprofen 400 mg oral tablet  -- 1 tab(s) by mouth 4 times a day, As needed, moderate and severe pain  -- Indication: For Pain    dilTIAZem 180 mg/24 hours oral capsule, extended release  -- 1 cap(s) by mouth once a day  -- Indication: For Tachycardia    guaiFENesin 100 mg/5 mL oral liquid  -- 10 milliliter(s) by mouth every 6 hours, As needed, Cough  -- Indication: For Cough    Levaquin 500 mg oral tablet  -- 1 tab(s) by mouth once a day   -- Avoid prolonged or excessive exposure to direct and/or artificial sunlight while taking this medication.  Do not take dairy products, antacids, or iron preparations within one hour of this medication.  Finish all this medication unless otherwise directed by prescriber.  May cause drowsiness or dizziness.  Medication should be taken with plenty of water.    -- Indication: For Sepsis

## 2018-01-18 NOTE — PROGRESS NOTE ADULT - ASSESSMENT
72 year old female with hypertension presents with cough, dyspnea, weight loss. She is septic with bandemia. Overall looks nontoxic. Not requiring oxygen at this time. The time course of her symptoms not consistent however with acute infection. Also concerned with 10 lbs weight loss over past four months. Will treat for community acquired pneumonia for now. Should have malignancy work up after acute infection resolves.
72 year old female with hypertension presents with cough, dyspnea, weight loss. She is septic with bandemia. Overall looks nontoxic. Not requiring oxygen at this time. The time course of her symptoms not consistent however with acute infection. Also concerned with 10 lbs weight loss over past four months. Will treat for community acquired pneumonia for now. Should have malignancy work up after acute infection resolves.

## 2018-01-18 NOTE — PROVIDER CONTACT NOTE (OTHER) - ACTION/TREATMENT ORDERED:
Obtain an EKG and give ibuprofen and robitussin.
admin 1L NS bolus over 4 hrs
admin ns 500ml
Obtain EKG.

## 2018-01-19 VITALS
HEART RATE: 115 BPM | OXYGEN SATURATION: 98 % | DIASTOLIC BLOOD PRESSURE: 85 MMHG | RESPIRATION RATE: 16 BRPM | TEMPERATURE: 98 F | SYSTOLIC BLOOD PRESSURE: 154 MMHG

## 2018-01-19 LAB
BUN SERPL-MCNC: 22 MG/DL — SIGNIFICANT CHANGE UP (ref 7–23)
CALCIUM SERPL-MCNC: 8.8 MG/DL — SIGNIFICANT CHANGE UP (ref 8.4–10.5)
CHLORIDE SERPL-SCNC: 96 MMOL/L — LOW (ref 98–107)
CO2 SERPL-SCNC: 23 MMOL/L — SIGNIFICANT CHANGE UP (ref 22–31)
CREAT SERPL-MCNC: 0.69 MG/DL — SIGNIFICANT CHANGE UP (ref 0.5–1.3)
GLUCOSE SERPL-MCNC: 116 MG/DL — HIGH (ref 70–99)
HCT VFR BLD CALC: 25.9 % — LOW (ref 34.5–45)
HGB BLD-MCNC: 8.4 G/DL — LOW (ref 11.5–15.5)
MCHC RBC-ENTMCNC: 25.9 PG — LOW (ref 27–34)
MCHC RBC-ENTMCNC: 32.4 % — SIGNIFICANT CHANGE UP (ref 32–36)
MCV RBC AUTO: 79.9 FL — LOW (ref 80–100)
NRBC # FLD: 0 — SIGNIFICANT CHANGE UP
PLATELET # BLD AUTO: 328 K/UL — SIGNIFICANT CHANGE UP (ref 150–400)
PMV BLD: 9.6 FL — SIGNIFICANT CHANGE UP (ref 7–13)
POTASSIUM SERPL-MCNC: 4 MMOL/L — SIGNIFICANT CHANGE UP (ref 3.5–5.3)
POTASSIUM SERPL-SCNC: 4 MMOL/L — SIGNIFICANT CHANGE UP (ref 3.5–5.3)
RBC # BLD: 3.24 M/UL — LOW (ref 3.8–5.2)
RBC # FLD: 15.9 % — HIGH (ref 10.3–14.5)
SODIUM SERPL-SCNC: 131 MMOL/L — LOW (ref 135–145)
WBC # BLD: 14.97 K/UL — HIGH (ref 3.8–10.5)
WBC # FLD AUTO: 14.97 K/UL — HIGH (ref 3.8–10.5)

## 2018-01-19 PROCEDURE — 99238 HOSP IP/OBS DSCHRG MGMT 30/<: CPT

## 2018-01-19 RX ORDER — DILTIAZEM HCL 120 MG
1 CAPSULE, EXT RELEASE 24 HR ORAL
Qty: 0 | Refills: 0 | COMMUNITY
Start: 2018-01-19

## 2018-01-19 RX ORDER — DILTIAZEM HCL 120 MG
180 CAPSULE, EXT RELEASE 24 HR ORAL DAILY
Qty: 0 | Refills: 0 | Status: DISCONTINUED | OUTPATIENT
Start: 2018-01-19 | End: 2018-01-19

## 2018-01-19 RX ORDER — CIPROFLOXACIN LACTATE 400MG/40ML
1 VIAL (ML) INTRAVENOUS
Qty: 3 | Refills: 0 | OUTPATIENT
Start: 2018-01-19 | End: 2018-01-21

## 2018-01-19 RX ADMIN — Medication 180 MILLIGRAM(S): at 11:41

## 2018-01-19 RX ADMIN — CHLORHEXIDINE GLUCONATE 1 APPLICATION(S): 213 SOLUTION TOPICAL at 11:41

## 2018-01-19 RX ADMIN — HEPARIN SODIUM 5000 UNIT(S): 5000 INJECTION INTRAVENOUS; SUBCUTANEOUS at 05:46

## 2018-01-19 NOTE — PROGRESS NOTE ADULT - SUBJECTIVE AND OBJECTIVE BOX
CHIEF COMPLAINT:    Interval Events:    REVIEW OF SYSTEMS:  Constitutional:   Eyes:  ENT:  CV:  Resp:  GI:  :  MSK:  Integumentary:  Neurological:  Psychiatric:  Endocrine:  Hematologic/Lymphatic:  Allergic/Immunologic:  [ ] All other systems negative  [ ] Unable to assess ROS because ________    OBJECTIVE:  ICU Vital Signs Last 24 Hrs  T(C): 36.6 (19 Jan 2018 05:45), Max: 37.3 (18 Jan 2018 21:59)  T(F): 97.9 (19 Jan 2018 05:45), Max: 99.1 (18 Jan 2018 21:59)  HR: 105 (19 Jan 2018 05:45) (105 - 127)  BP: 133/77 (19 Jan 2018 05:45) (133/77 - 151/68)  BP(mean): --  ABP: --  ABP(mean): --  RR: 16 (19 Jan 2018 05:45) (16 - 18)  SpO2: 98% (19 Jan 2018 05:45) (98% - 100%)        CAPILLARY BLOOD GLUCOSE          PHYSICAL EXAM:  General:   HEENT:   Lymph Nodes:  Neck:   Respiratory:   Cardiovascular:   Abdomen:   Extremities:   Skin:   Neurological:  Psychiatry:    HOSPITAL MEDICATIONS:  MEDICATIONS  (STANDING):  chlorhexidine 4% Liquid 1 Application(s) Topical daily  diltiazem    milliGRAM(s) Oral daily  heparin  Injectable 5000 Unit(s) SubCutaneous every 8 hours  levoFLOXacin IVPB 500 milliGRAM(s) IV Intermittent every 24 hours  sodium chloride 0.9%. 1000 milliLiter(s) (250 mL/Hr) IV Continuous <Continuous>  sodium chloride 0.9%. 1000 milliLiter(s) (250 mL/Hr) IV Continuous <Continuous>    MEDICATIONS  (PRN):  guaiFENesin    Syrup 200 milliGRAM(s) Oral every 6 hours PRN Cough  ibuprofen  Tablet 400 milliGRAM(s) Oral four times a day PRN moderate and severe pain      LABS:                        8.4    14.97 )-----------( 328      ( 19 Jan 2018 05:45 )             25.9     01-19    131<L>  |  96<L>  |  22  ----------------------------<  116<H>  4.0   |  23  |  0.69    Ca    8.8      19 Jan 2018 05:45                MICROBIOLOGY:     RADIOLOGY:  [ ] Reviewed and interpreted by me    PULMONARY FUNCTION TESTS:    EKG:

## 2018-01-19 NOTE — PROGRESS NOTE ADULT - ATTENDING COMMENTS
Improving sepsis, etiology unclear. leukocytosis improving, afebrile, bp stable, feels better  CT chest reviewed, no evidence of pneumonia  However, multiple liver lesions, looks c/w with met dz.  Pt has a h/o breast ca in 2003, mastectomy but no other tx, reports she is up to date with mammo, and had a colonoscopy about 3 years ago.  Has had a 10lb weight loss, but denies any GI sx.    Continue ceftriaxone/zithro for now. urine cx neg, blood cx neg to date, urine legionella pending.  Check CT abd/pelvis for further work up to start with
feels good.  leukocytosis improved  afebrile  complete 7 days abx, levaquin  Na improved  d/c home.  pt has an appt with her oncologist this afternoon.
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  Per d/w pt's oncologist, this has been known... and pt has been on different immuno/biologic therapy for the breast ca.  Pt still seems surprised by the news.    She is feeling better. Afebrile.   blood and urine cx negative, no sign of infection on CT of chest, abd , or pelvis.  leukocytosis improved but persistent.  May be related to met dz.  Also with hyponatremia, tachycardia.  Trial of fluids.  LE dopplers.  change to po levaquin.  d/c planning - pt actually has an appt with her oncologist, dr. waldrop, tomorrow

## 2018-01-21 LAB
BACTERIA BLD CULT: SIGNIFICANT CHANGE UP
BACTERIA BLD CULT: SIGNIFICANT CHANGE UP

## 2018-01-25 ENCOUNTER — INPATIENT (INPATIENT)
Facility: HOSPITAL | Age: 73
LOS: 5 days | Discharge: HOME | End: 2018-01-31
Attending: INTERNAL MEDICINE | Admitting: INTERNAL MEDICINE
Payer: MEDICARE

## 2018-01-25 VITALS
OXYGEN SATURATION: 100 % | TEMPERATURE: 103 F | DIASTOLIC BLOOD PRESSURE: 76 MMHG | HEART RATE: 134 BPM | SYSTOLIC BLOOD PRESSURE: 131 MMHG | RESPIRATION RATE: 22 BRPM

## 2018-01-25 DIAGNOSIS — Z90.11 ACQUIRED ABSENCE OF RIGHT BREAST AND NIPPLE: Chronic | ICD-10-CM

## 2018-01-25 LAB
ALBUMIN SERPL ELPH-MCNC: 2.9 G/DL — LOW (ref 3.3–5)
ALP SERPL-CCNC: 408 U/L — HIGH (ref 40–120)
ALT FLD-CCNC: 57 U/L — HIGH (ref 4–33)
APTT BLD: 28.1 SEC — SIGNIFICANT CHANGE UP (ref 27.5–37.4)
AST SERPL-CCNC: 410 U/L — HIGH (ref 4–32)
BASE EXCESS BLDV CALC-SCNC: 3.5 MMOL/L — SIGNIFICANT CHANGE UP
BASOPHILS # BLD AUTO: 0.05 K/UL — SIGNIFICANT CHANGE UP (ref 0–0.2)
BASOPHILS NFR BLD AUTO: 0.2 % — SIGNIFICANT CHANGE UP (ref 0–2)
BILIRUB SERPL-MCNC: 1.3 MG/DL — HIGH (ref 0.2–1.2)
BLOOD GAS VENOUS - CREATININE: 0.56 MG/DL — SIGNIFICANT CHANGE UP (ref 0.5–1.3)
BUN SERPL-MCNC: 14 MG/DL — SIGNIFICANT CHANGE UP (ref 7–23)
CALCIUM SERPL-MCNC: 9.1 MG/DL — SIGNIFICANT CHANGE UP (ref 8.4–10.5)
CHLORIDE BLDV-SCNC: 99 MMOL/L — SIGNIFICANT CHANGE UP (ref 96–108)
CHLORIDE SERPL-SCNC: 94 MMOL/L — LOW (ref 98–107)
CK MB BLD-MCNC: 1.04 NG/ML — SIGNIFICANT CHANGE UP (ref 1–4.7)
CK SERPL-CCNC: 1015 U/L — HIGH (ref 25–170)
CO2 SERPL-SCNC: 26 MMOL/L — SIGNIFICANT CHANGE UP (ref 22–31)
CREAT SERPL-MCNC: 0.67 MG/DL — SIGNIFICANT CHANGE UP (ref 0.5–1.3)
EOSINOPHIL # BLD AUTO: 0 K/UL — SIGNIFICANT CHANGE UP (ref 0–0.5)
EOSINOPHIL NFR BLD AUTO: 0 % — SIGNIFICANT CHANGE UP (ref 0–6)
GAS PNL BLDV: 129 MMOL/L — LOW (ref 136–146)
GLUCOSE BLDV-MCNC: 113 — HIGH (ref 70–99)
GLUCOSE SERPL-MCNC: 109 MG/DL — HIGH (ref 70–99)
HCO3 BLDV-SCNC: 28 MMOL/L — HIGH (ref 20–27)
HCT VFR BLD CALC: 30.7 % — LOW (ref 34.5–45)
HCT VFR BLDV CALC: 30.7 % — LOW (ref 34.5–45)
HGB BLD-MCNC: 10 G/DL — LOW (ref 11.5–15.5)
HGB BLDV-MCNC: 9.9 G/DL — LOW (ref 11.5–15.5)
IMM GRANULOCYTES # BLD AUTO: 0.22 # — SIGNIFICANT CHANGE UP
IMM GRANULOCYTES NFR BLD AUTO: 0.9 % — SIGNIFICANT CHANGE UP (ref 0–1.5)
INR BLD: 1.45 — HIGH (ref 0.88–1.17)
LACTATE BLDV-MCNC: 1.1 MMOL/L — SIGNIFICANT CHANGE UP (ref 0.5–2)
LYMPHOCYTES # BLD AUTO: 2.15 K/UL — SIGNIFICANT CHANGE UP (ref 1–3.3)
LYMPHOCYTES # BLD AUTO: 8.9 % — LOW (ref 13–44)
MCHC RBC-ENTMCNC: 26.5 PG — LOW (ref 27–34)
MCHC RBC-ENTMCNC: 32.6 % — SIGNIFICANT CHANGE UP (ref 32–36)
MCV RBC AUTO: 81.2 FL — SIGNIFICANT CHANGE UP (ref 80–100)
MONOCYTES # BLD AUTO: 2.13 K/UL — HIGH (ref 0–0.9)
MONOCYTES NFR BLD AUTO: 8.8 % — SIGNIFICANT CHANGE UP (ref 2–14)
NEUTROPHILS # BLD AUTO: 19.61 K/UL — HIGH (ref 1.8–7.4)
NEUTROPHILS NFR BLD AUTO: 81.2 % — HIGH (ref 43–77)
NRBC # FLD: 0 — SIGNIFICANT CHANGE UP
PCO2 BLDV: 36 MMHG — LOW (ref 41–51)
PH BLDV: 7.49 PH — HIGH (ref 7.32–7.43)
PLATELET # BLD AUTO: 357 K/UL — SIGNIFICANT CHANGE UP (ref 150–400)
PMV BLD: 9 FL — SIGNIFICANT CHANGE UP (ref 7–13)
PO2 BLDV: 72 MMHG — HIGH (ref 35–40)
POTASSIUM BLDV-SCNC: 4.5 MMOL/L — SIGNIFICANT CHANGE UP (ref 3.4–4.5)
POTASSIUM SERPL-MCNC: 4.5 MMOL/L — SIGNIFICANT CHANGE UP (ref 3.5–5.3)
POTASSIUM SERPL-SCNC: 4.5 MMOL/L — SIGNIFICANT CHANGE UP (ref 3.5–5.3)
PROT SERPL-MCNC: 7.1 G/DL — SIGNIFICANT CHANGE UP (ref 6–8.3)
PROTHROM AB SERPL-ACNC: 16.8 SEC — HIGH (ref 9.8–13.1)
RBC # BLD: 3.78 M/UL — LOW (ref 3.8–5.2)
RBC # FLD: 17.9 % — HIGH (ref 10.3–14.5)
SAO2 % BLDV: 95.1 % — HIGH (ref 60–85)
SODIUM SERPL-SCNC: 133 MMOL/L — LOW (ref 135–145)
TROPONIN T SERPL-MCNC: < 0.06 NG/ML — SIGNIFICANT CHANGE UP (ref 0–0.06)
WBC # BLD: 24.16 K/UL — HIGH (ref 3.8–10.5)
WBC # FLD AUTO: 24.16 K/UL — HIGH (ref 3.8–10.5)

## 2018-01-25 RX ORDER — SODIUM CHLORIDE 9 MG/ML
1000 INJECTION INTRAMUSCULAR; INTRAVENOUS; SUBCUTANEOUS ONCE
Qty: 0 | Refills: 0 | Status: COMPLETED | OUTPATIENT
Start: 2018-01-25 | End: 2018-01-25

## 2018-01-25 RX ORDER — AZITHROMYCIN 500 MG/1
500 TABLET, FILM COATED ORAL ONCE
Qty: 0 | Refills: 0 | Status: COMPLETED | OUTPATIENT
Start: 2018-01-25 | End: 2018-01-25

## 2018-01-25 RX ORDER — CEFEPIME 1 G/1
2000 INJECTION, POWDER, FOR SOLUTION INTRAMUSCULAR; INTRAVENOUS ONCE
Qty: 0 | Refills: 0 | Status: COMPLETED | OUTPATIENT
Start: 2018-01-25 | End: 2018-01-25

## 2018-01-25 RX ORDER — VANCOMYCIN HCL 1 G
1000 VIAL (EA) INTRAVENOUS ONCE
Qty: 0 | Refills: 0 | Status: COMPLETED | OUTPATIENT
Start: 2018-01-25 | End: 2018-01-26

## 2018-01-25 RX ORDER — ACETAMINOPHEN 500 MG
1000 TABLET ORAL ONCE
Qty: 0 | Refills: 0 | Status: COMPLETED | OUTPATIENT
Start: 2018-01-25 | End: 2018-01-25

## 2018-01-25 RX ADMIN — SODIUM CHLORIDE 1000 MILLILITER(S): 9 INJECTION INTRAMUSCULAR; INTRAVENOUS; SUBCUTANEOUS at 21:55

## 2018-01-25 RX ADMIN — Medication 400 MILLIGRAM(S): at 21:56

## 2018-01-25 NOTE — ED ADULT TRIAGE NOTE - CHIEF COMPLAINT QUOTE
Pt. c/o cough, fever, weakness x 2 days. Seen at [facility] on Tuesday for same symptoms. Also c/o palpitations and blurry vision x 2 days. Tachy in 130s in triage. Appears very weak. EKG in progress. pmhx HTN, breast ca, DM [id number]=[id number]

## 2018-01-25 NOTE — ED ADULT NURSE NOTE - CHIEF COMPLAINT QUOTE
Pt. c/o cough, fever, weakness x 2 days. Seen at [facility] on Tuesday for same symptoms. Also c/o palpitations and blurry vision x 2 days. Tachy in 130s in triage. Appears very weak. EKG in progress. pmhx HTN, breast ca, DM [id number]=[id number]

## 2018-01-25 NOTE — ED ADULT NURSE NOTE - OBJECTIVE STATEMENT
pt received in room 4. Pt is a&ox3 coming from PMD. p c/o weakness, flu like symptoms, high bp. pt denies chest pain/palpitations/SOB. pt has a hx of Breast CA. pt no on chemo. Pt denies N/V/D and has no abdominal distention noted. Pt rectal temp 102.3. /76. Pt placed on heart monitor ans is in Sinus tach. Medications given upon MD orders. labs collected and sent.

## 2018-01-25 NOTE — ED PROVIDER NOTE - OBJECTIVE STATEMENT
72F h/o R BRCA s/p mastectomy (mets to liver), HTN presenting with fever. Pt was recently discharged after being admitted for cough and dyspnea, has been feeling fine at home until today. Noted malaise, fever, chills, worsening of chronic cough. Denies sore throat, CP, abd pain, N/V/D.

## 2018-01-26 DIAGNOSIS — C50.911 MALIGNANT NEOPLASM OF UNSPECIFIED SITE OF RIGHT FEMALE BREAST: ICD-10-CM

## 2018-01-26 DIAGNOSIS — I10 ESSENTIAL (PRIMARY) HYPERTENSION: ICD-10-CM

## 2018-01-26 DIAGNOSIS — R50.9 FEVER, UNSPECIFIED: ICD-10-CM

## 2018-01-26 DIAGNOSIS — R65.10 SYSTEMIC INFLAMMATORY RESPONSE SYNDROME (SIRS) OF NON-INFECTIOUS ORIGIN WITHOUT ACUTE ORGAN DYSFUNCTION: ICD-10-CM

## 2018-01-26 DIAGNOSIS — Z29.9 ENCOUNTER FOR PROPHYLACTIC MEASURES, UNSPECIFIED: ICD-10-CM

## 2018-01-26 LAB
ALBUMIN SERPL ELPH-MCNC: 2.6 G/DL — LOW (ref 3.3–5)
ALP SERPL-CCNC: 344 U/L — HIGH (ref 40–120)
ALT FLD-CCNC: 49 U/L — HIGH (ref 4–33)
AST SERPL-CCNC: 288 U/L — HIGH (ref 4–32)
B PERT DNA SPEC QL NAA+PROBE: SIGNIFICANT CHANGE UP
BASOPHILS # BLD AUTO: 0.04 K/UL — SIGNIFICANT CHANGE UP (ref 0–0.2)
BASOPHILS NFR BLD AUTO: 0.1 % — SIGNIFICANT CHANGE UP (ref 0–2)
BILIRUB SERPL-MCNC: 2.9 MG/DL — HIGH (ref 0.2–1.2)
BUN SERPL-MCNC: 19 MG/DL — SIGNIFICANT CHANGE UP (ref 7–23)
C PNEUM DNA SPEC QL NAA+PROBE: NOT DETECTED — SIGNIFICANT CHANGE UP
CALCIUM SERPL-MCNC: 8.9 MG/DL — SIGNIFICANT CHANGE UP (ref 8.4–10.5)
CHLORIDE SERPL-SCNC: 95 MMOL/L — LOW (ref 98–107)
CK SERPL-CCNC: 861 U/L — HIGH (ref 25–170)
CO2 SERPL-SCNC: 24 MMOL/L — SIGNIFICANT CHANGE UP (ref 22–31)
CREAT SERPL-MCNC: 0.79 MG/DL — SIGNIFICANT CHANGE UP (ref 0.5–1.3)
EOSINOPHIL # BLD AUTO: 0 K/UL — SIGNIFICANT CHANGE UP (ref 0–0.5)
EOSINOPHIL NFR BLD AUTO: 0 % — SIGNIFICANT CHANGE UP (ref 0–6)
FLUAV H1 2009 PAND RNA SPEC QL NAA+PROBE: NOT DETECTED — SIGNIFICANT CHANGE UP
FLUAV H1 RNA SPEC QL NAA+PROBE: NOT DETECTED — SIGNIFICANT CHANGE UP
FLUAV H3 RNA SPEC QL NAA+PROBE: NOT DETECTED — SIGNIFICANT CHANGE UP
FLUAV SUBTYP SPEC NAA+PROBE: SIGNIFICANT CHANGE UP
FLUBV RNA SPEC QL NAA+PROBE: NOT DETECTED — SIGNIFICANT CHANGE UP
GLUCOSE SERPL-MCNC: 109 MG/DL — HIGH (ref 70–99)
HADV DNA SPEC QL NAA+PROBE: NOT DETECTED — SIGNIFICANT CHANGE UP
HCOV 229E RNA SPEC QL NAA+PROBE: NOT DETECTED — SIGNIFICANT CHANGE UP
HCOV HKU1 RNA SPEC QL NAA+PROBE: NOT DETECTED — SIGNIFICANT CHANGE UP
HCOV NL63 RNA SPEC QL NAA+PROBE: NOT DETECTED — SIGNIFICANT CHANGE UP
HCOV OC43 RNA SPEC QL NAA+PROBE: NOT DETECTED — SIGNIFICANT CHANGE UP
HCT VFR BLD CALC: 29.1 % — LOW (ref 34.5–45)
HGB BLD-MCNC: 9.5 G/DL — LOW (ref 11.5–15.5)
HMPV RNA SPEC QL NAA+PROBE: NOT DETECTED — SIGNIFICANT CHANGE UP
HPIV1 RNA SPEC QL NAA+PROBE: NOT DETECTED — SIGNIFICANT CHANGE UP
HPIV2 RNA SPEC QL NAA+PROBE: NOT DETECTED — SIGNIFICANT CHANGE UP
HPIV3 RNA SPEC QL NAA+PROBE: NOT DETECTED — SIGNIFICANT CHANGE UP
HPIV4 RNA SPEC QL NAA+PROBE: NOT DETECTED — SIGNIFICANT CHANGE UP
IMM GRANULOCYTES # BLD AUTO: 0.24 # — SIGNIFICANT CHANGE UP
IMM GRANULOCYTES NFR BLD AUTO: 0.8 % — SIGNIFICANT CHANGE UP (ref 0–1.5)
LYMPHOCYTES # BLD AUTO: 1.78 K/UL — SIGNIFICANT CHANGE UP (ref 1–3.3)
LYMPHOCYTES # BLD AUTO: 6.2 % — LOW (ref 13–44)
M PNEUMO DNA SPEC QL NAA+PROBE: NOT DETECTED — SIGNIFICANT CHANGE UP
MAGNESIUM SERPL-MCNC: 1.6 MG/DL — SIGNIFICANT CHANGE UP (ref 1.6–2.6)
MCHC RBC-ENTMCNC: 26.5 PG — LOW (ref 27–34)
MCHC RBC-ENTMCNC: 32.6 % — SIGNIFICANT CHANGE UP (ref 32–36)
MCV RBC AUTO: 81.3 FL — SIGNIFICANT CHANGE UP (ref 80–100)
MONOCYTES # BLD AUTO: 2.04 K/UL — HIGH (ref 0–0.9)
MONOCYTES NFR BLD AUTO: 7.1 % — SIGNIFICANT CHANGE UP (ref 2–14)
NEUTROPHILS # BLD AUTO: 24.75 K/UL — HIGH (ref 1.8–7.4)
NEUTROPHILS NFR BLD AUTO: 85.8 % — HIGH (ref 43–77)
NRBC # FLD: 0 — SIGNIFICANT CHANGE UP
PHOSPHATE SERPL-MCNC: 3.3 MG/DL — SIGNIFICANT CHANGE UP (ref 2.5–4.5)
PLATELET # BLD AUTO: 277 K/UL — SIGNIFICANT CHANGE UP (ref 150–400)
PMV BLD: 9.2 FL — SIGNIFICANT CHANGE UP (ref 7–13)
POTASSIUM SERPL-MCNC: 4.5 MMOL/L — SIGNIFICANT CHANGE UP (ref 3.5–5.3)
POTASSIUM SERPL-SCNC: 4.5 MMOL/L — SIGNIFICANT CHANGE UP (ref 3.5–5.3)
PROT SERPL-MCNC: 6.8 G/DL — SIGNIFICANT CHANGE UP (ref 6–8.3)
RBC # BLD: 3.58 M/UL — LOW (ref 3.8–5.2)
RBC # FLD: 18 % — HIGH (ref 10.3–14.5)
RSV RNA SPEC QL NAA+PROBE: NOT DETECTED — SIGNIFICANT CHANGE UP
RV+EV RNA SPEC QL NAA+PROBE: NOT DETECTED — SIGNIFICANT CHANGE UP
SODIUM SERPL-SCNC: 132 MMOL/L — LOW (ref 135–145)
SPECIMEN SOURCE: SIGNIFICANT CHANGE UP
SPECIMEN SOURCE: SIGNIFICANT CHANGE UP
WBC # BLD: 28.85 K/UL — HIGH (ref 3.8–10.5)
WBC # FLD AUTO: 28.85 K/UL — HIGH (ref 3.8–10.5)

## 2018-01-26 PROCEDURE — 99223 1ST HOSP IP/OBS HIGH 75: CPT

## 2018-01-26 PROCEDURE — 71045 X-RAY EXAM CHEST 1 VIEW: CPT | Mod: 26

## 2018-01-26 RX ORDER — VANCOMYCIN HCL 1 G
1000 VIAL (EA) INTRAVENOUS EVERY 12 HOURS
Qty: 0 | Refills: 0 | Status: DISCONTINUED | OUTPATIENT
Start: 2018-01-26 | End: 2018-01-31

## 2018-01-26 RX ORDER — CEFEPIME 1 G/1
2000 INJECTION, POWDER, FOR SOLUTION INTRAMUSCULAR; INTRAVENOUS EVERY 8 HOURS
Qty: 0 | Refills: 0 | Status: DISCONTINUED | OUTPATIENT
Start: 2018-01-26 | End: 2018-01-31

## 2018-01-26 RX ORDER — ACETAMINOPHEN 500 MG
650 TABLET ORAL EVERY 6 HOURS
Qty: 0 | Refills: 0 | Status: DISCONTINUED | OUTPATIENT
Start: 2018-01-26 | End: 2018-01-31

## 2018-01-26 RX ORDER — SODIUM CHLORIDE 9 MG/ML
500 INJECTION INTRAMUSCULAR; INTRAVENOUS; SUBCUTANEOUS ONCE
Qty: 0 | Refills: 0 | Status: DISCONTINUED | OUTPATIENT
Start: 2018-01-26 | End: 2018-01-26

## 2018-01-26 RX ORDER — KETOROLAC TROMETHAMINE 30 MG/ML
30 SYRINGE (ML) INJECTION ONCE
Qty: 0 | Refills: 0 | Status: DISCONTINUED | OUTPATIENT
Start: 2018-01-26 | End: 2018-01-26

## 2018-01-26 RX ORDER — ENOXAPARIN SODIUM 100 MG/ML
40 INJECTION SUBCUTANEOUS EVERY 24 HOURS
Qty: 0 | Refills: 0 | Status: DISCONTINUED | OUTPATIENT
Start: 2018-01-26 | End: 2018-01-31

## 2018-01-26 RX ORDER — SODIUM CHLORIDE 9 MG/ML
1000 INJECTION INTRAMUSCULAR; INTRAVENOUS; SUBCUTANEOUS
Qty: 0 | Refills: 0 | Status: DISCONTINUED | OUTPATIENT
Start: 2018-01-26 | End: 2018-01-31

## 2018-01-26 RX ORDER — BENZOCAINE AND MENTHOL 5; 1 G/100ML; G/100ML
1 LIQUID ORAL
Qty: 0 | Refills: 0 | Status: DISCONTINUED | OUTPATIENT
Start: 2018-01-26 | End: 2018-01-31

## 2018-01-26 RX ADMIN — ENOXAPARIN SODIUM 40 MILLIGRAM(S): 100 INJECTION SUBCUTANEOUS at 13:20

## 2018-01-26 RX ADMIN — CEFEPIME 100 MILLIGRAM(S): 1 INJECTION, POWDER, FOR SOLUTION INTRAMUSCULAR; INTRAVENOUS at 09:25

## 2018-01-26 RX ADMIN — Medication 200 MILLIGRAM(S): at 17:24

## 2018-01-26 RX ADMIN — Medication 30 MILLIGRAM(S): at 08:30

## 2018-01-26 RX ADMIN — BENZOCAINE AND MENTHOL 1 LOZENGE: 5; 1 LIQUID ORAL at 20:28

## 2018-01-26 RX ADMIN — Medication 250 MILLIGRAM(S): at 02:21

## 2018-01-26 RX ADMIN — CEFEPIME 100 MILLIGRAM(S): 1 INJECTION, POWDER, FOR SOLUTION INTRAMUSCULAR; INTRAVENOUS at 17:23

## 2018-01-26 RX ADMIN — Medication 30 MILLIGRAM(S): at 08:15

## 2018-01-26 RX ADMIN — Medication 650 MILLIGRAM(S): at 18:55

## 2018-01-26 RX ADMIN — CEFEPIME 100 MILLIGRAM(S): 1 INJECTION, POWDER, FOR SOLUTION INTRAMUSCULAR; INTRAVENOUS at 00:05

## 2018-01-26 RX ADMIN — AZITHROMYCIN 250 MILLIGRAM(S): 500 TABLET, FILM COATED ORAL at 00:42

## 2018-01-26 RX ADMIN — Medication 250 MILLIGRAM(S): at 13:20

## 2018-01-26 RX ADMIN — BENZOCAINE AND MENTHOL 1 LOZENGE: 5; 1 LIQUID ORAL at 17:32

## 2018-01-26 RX ADMIN — SODIUM CHLORIDE 50 MILLILITER(S): 9 INJECTION INTRAMUSCULAR; INTRAVENOUS; SUBCUTANEOUS at 23:34

## 2018-01-26 NOTE — H&P ADULT - NSHPLABSRESULTS_GEN_ALL_CORE
LABS and ADDITIONAL STUDIES:                        10.0   24.16 )-----------( 357      ( 25 Jan 2018 21:15 )             30.7   N 81.2%    01-25    133<L>  |  94<L>  |  14  ----------------------------<  109<H>  4.5   |  26  |  0.67    Ca    9.1      25 Jan 2018 21:15    TPro  7.1  /  Alb  2.9<L>  /  TBili  1.3<H>  /  DBili  x   /  AST  410<H>  /  ALT  57<H>  /  AlkPhos  408<H>  01-25    CARDIAC MARKERS ( 25 Jan 2018 21:15 )  x     / < 0.06 ng/mL / 1015 u/L / 1.04 ng/mL / x        LIVER FUNCTIONS - ( 25 Jan 2018 21:15 )  Alb: 2.9 g/dL / Pro: 7.1 g/dL / ALK PHOS: 408 u/L / ALT: 57 u/L / AST: 410 u/L / GGT: x           PT/INR - ( 25 Jan 2018 21:15 )   PT: 16.8 SEC;   INR: 1.45     PTT - ( 25 Jan 2018 21:15 )  PTT:28.1 SEC LABS and ADDITIONAL STUDIES:                        10.0   24.16 )-----------( 357      ( 25 Jan 2018 21:15 )             30.7   N 81.2%    01-25    133<L>  |  94<L>  |  14  ----------------------------<  109<H>  4.5   |  26  |  0.67    Ca    9.1      25 Jan 2018 21:15    TPro  7.1  /  Alb  2.9<L>  /  TBili  1.3<H>  /  DBili  x   /  AST  410<H>  /  ALT  57<H>  /  AlkPhos  408<H>  01-25    CARDIAC MARKERS ( 25 Jan 2018 21:15 )  x     / < 0.06 ng/mL / 1015 u/L / 1.04 ng/mL / x        LIVER FUNCTIONS - ( 25 Jan 2018 21:15 )  Alb: 2.9 g/dL / Pro: 7.1 g/dL / ALK PHOS: 408 u/L / ALT: 57 u/L / AST: 410 u/L / GGT: x           PT/INR - ( 25 Jan 2018 21:15 )   PT: 16.8 SEC;   INR: 1.45     PTT - ( 25 Jan 2018 21:15 )  PTT:28.1 SEC    Lactate, VBG - 1.1    RVP - negative    CXR - no focal consolidations LABS and ADDITIONAL STUDIES:                        10.0   24.16 )-----------( 357      ( 25 Jan 2018 21:15 )             30.7   N 81.2%    01-25    133<L>  |  94<L>  |  14  ----------------------------<  109<H>  4.5   |  26  |  0.67    Ca    9.1      25 Jan 2018 21:15    TPro  7.1  /  Alb  2.9<L>  /  TBili  1.3<H>  /  DBili  x   /  AST  410<H>  /  ALT  57<H>  /  AlkPhos  408<H>  01-25    CARDIAC MARKERS ( 25 Jan 2018 21:15 )  x     / < 0.06 ng/mL / 1015 u/L / 1.04 ng/mL / x        LIVER FUNCTIONS - ( 25 Jan 2018 21:15 )  Alb: 2.9 g/dL / Pro: 7.1 g/dL / ALK PHOS: 408 u/L / ALT: 57 u/L / AST: 410 u/L / GGT: x           PT/INR - ( 25 Jan 2018 21:15 )   PT: 16.8 SEC;   INR: 1.45     PTT - ( 25 Jan 2018 21:15 )  PTT:28.1 SEC    Lactate, VBG - 1.1    RVP - negative    CXR - no focal consolidations    EKG - Sinus tach at 130, nl axis, QTc 406

## 2018-01-26 NOTE — PROGRESS NOTE ADULT - PROBLEM SELECTOR PLAN 2
- outpatient f/u with her oncologist  - Suspect patient's transaminitis is 2/2 to her liver mets, would monitor levels in AM  -Pt had PET scan which showed only liver lesions per pt as OP

## 2018-01-26 NOTE — H&P ADULT - HISTORY OF PRESENT ILLNESS
This is a 72F with history of R breast cancer s/p mastectomy and HTN who presents to the hospital with complaints of fevers, worsening cough and generalized weakness at home. Pt was recently hospitalized from 1/16-1/19 for a cough and SOB that had been ongoing for ~4 months. She was treated with levaquin with improvement in her symptoms and was discharged home. Said that she was recovering well at home until about 2 days ago when she started to develop fevers (did not measure her temperatures but said she felt very feverish). She also developed a severe cough with production of some clear sputum during this time. Said that she is coughing so much that now she gets muscle aches whenever she coughs. Said that she also started to experience generalized weakness and decreased appetite during this time. Also endorses nasal congestion and a sore throat. All of these symptoms, as per the patient, are new over the past 2 days and did not occur with her previous episode of cough/SOB. She denies any known sick contacts at home. Denies any other symptoms.     Of note, pt had a PET scan after her discahrge given her liver mets and said that she was due to start a new round of chemotherapy but had not yet started anything.    In the ED, her vitals were T 102.5 (also Tmax), P 134, /76, R 22, o2 sat 100% RA. Her lab work was significant for leukocytosis with neutrophilia, mild hyponatremia, transaminitis, elevated CK, and a lactate of 1.1. Her RVP was negative. Her EKG showed sinus tachycardia. Her CXR did not show any focal consolidations. She was given vancomycin 1g IVPB x1, cefepime 2g IVPB x1, azithromycin 500mg IVPB x1, NS 1L, and acetaminophen 1g IVPB x1. She was admitted to the pulmonary unit.     Review of patient's previous admission shows that she did not have any pulmonary consolidations on CT Chest and her BCx, UCx, RVP, and Urine Legionella were all negative. This is a 72F with history of R breast cancer s/p mastectomy and HTN who presents to the hospital with complaints of fevers, worsening cough and generalized weakness at home. Pt was recently hospitalized from 1/16-1/19 for a cough and SOB that had been ongoing for ~4 months. She was treated with levaquin with improvement in her symptoms and was discharged home. Said that she was recovering well at home until about 2 days ago when she started to develop fevers (did not measure her temperatures but said she felt very feverish). She also developed a severe cough with production of some clear sputum during this time. Said that she is coughing so much that now she gets muscle aches whenever she coughs. Said that she also started to experience generalized weakness and decreased appetite during this time. Also endorses nasal congestion and a sore throat. All of these symptoms, as per the patient, are new over the past 2 days and did not occur with her previous episode of cough/SOB. She denies any known sick contacts at home. Denies any other symptoms.     In the ED, her vitals were T 102.5 (also Tmax), P 134, /76, R 22, o2 sat 100% RA. Her lab work was significant for leukocytosis with neutrophilia, mild hyponatremia, transaminitis, elevated CK, and a lactate of 1.1. Her RVP was negative. Her EKG showed sinus tachycardia. Her CXR did not show any focal consolidations. She was given vancomycin 1g IVPB x1, cefepime 2g IVPB x1, azithromycin 500mg IVPB x1, NS 1L, and acetaminophen 1g IVPB x1. She was admitted to the pulmonary unit.     Review of patient's previous admission shows that she did not have any pulmonary consolidations on CT Chest and her BCx, UCx, RVP, and Urine Legionella were all negative. She was also found to have multiple hepatic metastases. After discharge, she had a follow up with her oncologist, had a PET scan and was due to start a new round to chemotherapy but had not start any treatments yet.

## 2018-01-26 NOTE — CHART NOTE - NSCHARTNOTEFT_GEN_A_CORE
Pt with noted tachycardia on routine vs check, nurse to check temp and 102  will send blood cx x 2 urine and c/s   continue with iv abx   Procalcitonin pending   vs every 4 hours for patient /prn

## 2018-01-26 NOTE — H&P ADULT - PROBLEM SELECTOR PROBLEM 2
no Malignant neoplasm of right female breast, unspecified estrogen receptor status, unspecified site of breast

## 2018-01-26 NOTE — H&P ADULT - NSHPPHYSICALEXAM_GEN_ALL_CORE
Vital Signs Last 24 Hrs  T(C): 37.8 (26 Jan 2018 05:11), Max: 39.2 (25 Jan 2018 19:29)  T(F): 100 (26 Jan 2018 05:11), Max: 102.5 (25 Jan 2018 19:29)  HR: 117 (26 Jan 2018 05:11) (101 - 134)  BP: 136/69 (26 Jan 2018 05:11) (123/57 - 136/69)  BP(mean): --  RR: 24 (26 Jan 2018 05:11) (18 - 24)  SpO2: 99% (26 Jan 2018 05:11) (99% - 100%)    GENERAL: No acute distress, well-developed  HEAD:  Atraumatic, Normocephalic  ENT: EOMI, PERRLA, conjunctiva and sclera clear, Neck supple, No JVD, moist mucosa  CHEST/LUNG: Clear to auscultation bilaterally; No wheeze, equal breath sounds bilaterally   BACK: No spinal tenderness  HEART: Tachycardia; No murmurs, rubs, or gallops  ABDOMEN: Soft, Nontender, Nondistended; Bowel sounds present  EXTREMITIES:  No clubbing, cyanosis, or edema  PSYCH: Nl behavior, nl affect  NEUROLOGY: AAOx3, non-focal  SKIN: Normal color, No rashes or lesions

## 2018-01-26 NOTE — H&P ADULT - ASSESSMENT
This is a 72F with history of R breast cancer with mets to liver and HTN who presents to the hospital with complaints of fevers, worsening cough and generalized weakness. Found to have SIRS with unclear infectious source.

## 2018-01-26 NOTE — H&P ADULT - PROBLEM SELECTOR PLAN 2
- outpatient f/u with her oncologist - outpatient f/u with her oncologist  - Suspect patient's transaminitis is 2/2 to her liver mets, would monitor levels in AM

## 2018-01-26 NOTE — H&P ADULT - PROBLEM SELECTOR PLAN 3
- Pt states that currently she is off all antihypertensives 2/2 running out of meds  - Her BP currently well controlled, would hold off on cardizem and monitor

## 2018-01-26 NOTE — H&P ADULT - PMH
Hypertension    Malignant neoplasm of right female breast, unspecified estrogen receptor status, unspecified site of breast  s/p mastectomy

## 2018-01-26 NOTE — H&P ADULT - PROBLEM SELECTOR PLAN 1
- Pt with significant fevers, tachycardia, and leukocytosis but no identifiable source of infection  - Received vanc/cefepime/azithromycin in ED, while there is no identifiable sources of infection at this time given her significant symptoms would still cover her with empiric abx with vanc/cefepime, will hold azithromycin for now  - Her RVP is negative but given her constellation of symptoms (rhinorrhea, sore throat, cough) suspect she might still have a viral syndrome, unclear if a procalcitonin would be of benefit at this time, would defer decision to primary team  - Will place on prn tylenol/robitussin/lozenge prns  - Consideration that her SIRS symptoms could be 2/2 her cancer but that would not explain her cough/sore throat/nasal congestion so this is less likely as a cause of her symptoms  - VS q4h - Pt with significant fevers, tachycardia, and leukocytosis but no identifiable source of infection  - Received vanc/cefepime/azithromycin in ED, while there is no identifiable sources of infection at this time given her significant symptoms would still cover her with empiric abx with vanc/cefepime, will hold azithromycin for now  - Her RVP is negative but given her constellation of symptoms (rhinorrhea, sore throat, cough) suspect she might still have a viral syndrome, unclear if a procalcitonin would be of benefit at this time, would defer decision to primary team  - Will place on prn tylenol/robitussin/lozenge prns  - Consideration that her SIRS symptoms could be 2/2 her cancer but that would not explain her cough/sore throat/nasal congestion so this is less likely as a cause of her symptoms  - VS q4h  - suspect patient's CPK elevation might be related to her abdominal pain from cough/?myalgias, pt without chest pain and EKG without acute findings other than tachycardia, would repeat levels in AM

## 2018-01-26 NOTE — PROGRESS NOTE ADULT - SUBJECTIVE AND OBJECTIVE BOX
CHIEF COMPLAINT:    Interval Events:    REVIEW OF SYSTEMS:  Constitutional:   Eyes:  ENT:  CV:  Resp:  GI:  :  MSK:  Integumentary:  Neurological:  Psychiatric:  Endocrine:  Hematologic/Lymphatic:  Allergic/Immunologic:  [ ] All other systems negative  [ ] Unable to assess ROS because ________    OBJECTIVE:  ICU Vital Signs Last 24 Hrs  T(C): 37.2 (26 Jan 2018 07:06), Max: 39.2 (25 Jan 2018 19:29)  T(F): 98.9 (26 Jan 2018 07:06), Max: 102.5 (25 Jan 2018 19:29)  HR: 130 (26 Jan 2018 07:06) (101 - 134)  BP: 136/79 (26 Jan 2018 07:06) (123/57 - 136/79)  BP(mean): --  ABP: --  ABP(mean): --  RR: 18 (26 Jan 2018 07:06) (18 - 24)  SpO2: 100% (26 Jan 2018 07:06) (99% - 100%)        CAPILLARY BLOOD GLUCOSE      POCT Blood Glucose.: 106 mg/dL (25 Jan 2018 22:18)      PHYSICAL EXAM:  General:   HEENT:   Lymph Nodes:  Neck:   Respiratory:   Cardiovascular:   Abdomen:   Extremities:   Skin:   Neurological:  Psychiatry:    HOSPITAL MEDICATIONS:  MEDICATIONS  (STANDING):  cefepime  IVPB 2000 milliGRAM(s) IV Intermittent every 8 hours  enoxaparin Injectable 40 milliGRAM(s) SubCutaneous every 24 hours  vancomycin  IVPB 1000 milliGRAM(s) IV Intermittent every 12 hours    MEDICATIONS  (PRN):  acetaminophen   Tablet 650 milliGRAM(s) Oral every 6 hours PRN For Temp greater than 38.5 C (101.3 F)  benzocaine 15 mG/menthol 3.6 mG Lozenge 1 Lozenge Oral every 1 hour PRN Sore Throat  guaiFENesin    Syrup 200 milliGRAM(s) Oral every 6 hours PRN Cough      LABS:                        10.0   24.16 )-----------( 357      ( 25 Jan 2018 21:15 )             30.7     01-25    133<L>  |  94<L>  |  14  ----------------------------<  109<H>  4.5   |  26  |  0.67    Ca    9.1      25 Jan 2018 21:15    TPro  7.1  /  Alb  2.9<L>  /  TBili  1.3<H>  /  DBili  x   /  AST  410<H>  /  ALT  57<H>  /  AlkPhos  408<H>  01-25    PT/INR - ( 25 Jan 2018 21:15 )   PT: 16.8 SEC;   INR: 1.45          PTT - ( 25 Jan 2018 21:15 )  PTT:28.1 SEC      Venous Blood Gas:  01-25 @ 21:15  7.49/36/72/28/95.1  VBG Lactate: 1.1      MICROBIOLOGY:     RADIOLOGY:  [ ] Reviewed and interpreted by me    PULMONARY FUNCTION TESTS:    EKG: CHIEF COMPLAINT:Patient is a 72y old  Female who presents with a chief complaint of Fevers, cough, generalized weakness (26 Jan 2018 05:38)      Interval Events: none    REVIEW OF SYSTEMS:  Constitutional: no fever or chills   Eyes:  ENT:  CV: denies   Resp: + cough   GI:  :  MSK:  Integumentary:  Neurological:  Psychiatric:  Endocrine:  Hematologic/Lymphatic:  Allergic/Immunologic:  [x ] All other systems negative  [ ] Unable to assess ROS because ________    OBJECTIVE:  ICU Vital Signs Last 24 Hrs  T(C): 37.2 (26 Jan 2018 07:06), Max: 39.2 (25 Jan 2018 19:29)  T(F): 98.9 (26 Jan 2018 07:06), Max: 102.5 (25 Jan 2018 19:29)  HR: 130 (26 Jan 2018 07:06) (101 - 134)  BP: 136/79 (26 Jan 2018 07:06) (123/57 - 136/79)  BP(mean): --  ABP: --  ABP(mean): --  RR: 18 (26 Jan 2018 07:06) (18 - 24)  SpO2: 100% (26 Jan 2018 07:06) (99% - 100%)        CAPILLARY BLOOD GLUCOSE      POCT Blood Glucose.: 106 mg/dL (25 Jan 2018 22:18)      HOSPITAL MEDICATIONS:  MEDICATIONS  (STANDING):  cefepime  IVPB 2000 milliGRAM(s) IV Intermittent every 8 hours  enoxaparin Injectable 40 milliGRAM(s) SubCutaneous every 24 hours  vancomycin  IVPB 1000 milliGRAM(s) IV Intermittent every 12 hours    MEDICATIONS  (PRN):  acetaminophen   Tablet 650 milliGRAM(s) Oral every 6 hours PRN For Temp greater than 38.5 C (101.3 F)  benzocaine 15 mG/menthol 3.6 mG Lozenge 1 Lozenge Oral every 1 hour PRN Sore Throat  guaiFENesin    Syrup 200 milliGRAM(s) Oral every 6 hours PRN Cough      LABS:                        10.0   24.16 )-----------( 357      ( 25 Jan 2018 21:15 )             30.7     01-25    133<L>  |  94<L>  |  14  ----------------------------<  109<H>  4.5   |  26  |  0.67    Ca    9.1      25 Jan 2018 21:15    TPro  7.1  /  Alb  2.9<L>  /  TBili  1.3<H>  /  DBili  x   /  AST  410<H>  /  ALT  57<H>  /  AlkPhos  408<H>  01-25    PT/INR - ( 25 Jan 2018 21:15 )   PT: 16.8 SEC;   INR: 1.45          PTT - ( 25 Jan 2018 21:15 )  PTT:28.1 SEC      Venous Blood Gas:  01-25 @ 21:15  7.49/36/72/28/95.1  VBG Lactate: 1.1      MICROBIOLOGY:     RADIOLOGY:  [ ] Reviewed and interpreted by me    PULMONARY FUNCTION TESTS:    EKG:

## 2018-01-26 NOTE — CHART NOTE - NSCHARTNOTEFT_GEN_A_CORE
ADS    Na 132 - NS at 50 cc/hr x 8 hours to be infused.    plan  -f/u Na in am____  -will continue to monitor    Mari MIRELES ACNP

## 2018-01-26 NOTE — H&P ADULT - NSHPREVIEWOFSYSTEMS_GEN_ALL_CORE
REVIEW OF SYSTEMS:    CONSTITUTIONAL: +Fevers/chills, +Generalized weakness, Decreased appetite  EYES/ENT: +Nasal congestion, +Sore throat; No visual changes;  No dysphagia  NECK: No pain or stiffness  RESPIRATORY: +Cough minimally productive of clear sputum, no SOB  CARDIOVASCULAR: No chest pain or palpitations; No lower extremity edema  GASTROINTESTINAL: +Umbilical abdominal pain 2/2 coughing. No nausea, vomiting, or hematemesis; No diarrhea or constipation. No melena or hematochezia.  BACK: No back pain  GENITOURINARY: No dysuria, frequency or hematuria  NEUROLOGICAL: No numbness or weakness  SKIN: No itching, burning, rashes, or lesions   All other review of systems is negative unless indicated above.

## 2018-01-27 LAB
ALBUMIN SERPL ELPH-MCNC: 2.3 G/DL — LOW (ref 3.3–5)
ALP SERPL-CCNC: 284 U/L — HIGH (ref 40–120)
ALT FLD-CCNC: 35 U/L — HIGH (ref 4–33)
APPEARANCE UR: SIGNIFICANT CHANGE UP
AST SERPL-CCNC: 159 U/L — HIGH (ref 4–32)
BACTERIA # UR AUTO: SIGNIFICANT CHANGE UP
BASOPHILS # BLD AUTO: 0.04 K/UL — SIGNIFICANT CHANGE UP (ref 0–0.2)
BASOPHILS NFR BLD AUTO: 0.2 % — SIGNIFICANT CHANGE UP (ref 0–2)
BILIRUB SERPL-MCNC: 2.4 MG/DL — HIGH (ref 0.2–1.2)
BILIRUB UR-MCNC: NEGATIVE — SIGNIFICANT CHANGE UP
BLOOD UR QL VISUAL: NEGATIVE — SIGNIFICANT CHANGE UP
BUN SERPL-MCNC: 21 MG/DL — SIGNIFICANT CHANGE UP (ref 7–23)
CALCIUM SERPL-MCNC: 8.5 MG/DL — SIGNIFICANT CHANGE UP (ref 8.4–10.5)
CHLORIDE SERPL-SCNC: 93 MMOL/L — LOW (ref 98–107)
CK SERPL-CCNC: 586 U/L — HIGH (ref 25–170)
CO2 SERPL-SCNC: 23 MMOL/L — SIGNIFICANT CHANGE UP (ref 22–31)
COLOR SPEC: YELLOW — SIGNIFICANT CHANGE UP
CREAT SERPL-MCNC: 0.75 MG/DL — SIGNIFICANT CHANGE UP (ref 0.5–1.3)
EOSINOPHIL # BLD AUTO: 0.02 K/UL — SIGNIFICANT CHANGE UP (ref 0–0.5)
EOSINOPHIL NFR BLD AUTO: 0.1 % — SIGNIFICANT CHANGE UP (ref 0–6)
ERYTHROCYTE [SEDIMENTATION RATE] IN BLOOD: 104 MM/HR — HIGH (ref 4–25)
GLUCOSE SERPL-MCNC: 101 MG/DL — HIGH (ref 70–99)
GLUCOSE UR-MCNC: NEGATIVE — SIGNIFICANT CHANGE UP
HCT VFR BLD CALC: 26 % — LOW (ref 34.5–45)
HGB BLD-MCNC: 8.6 G/DL — LOW (ref 11.5–15.5)
IMM GRANULOCYTES # BLD AUTO: 0.28 # — SIGNIFICANT CHANGE UP
IMM GRANULOCYTES NFR BLD AUTO: 1.1 % — SIGNIFICANT CHANGE UP (ref 0–1.5)
KETONES UR-MCNC: NEGATIVE — SIGNIFICANT CHANGE UP
LEUKOCYTE ESTERASE UR-ACNC: NEGATIVE — SIGNIFICANT CHANGE UP
LYMPHOCYTES # BLD AUTO: 1.51 K/UL — SIGNIFICANT CHANGE UP (ref 1–3.3)
LYMPHOCYTES # BLD AUTO: 5.9 % — LOW (ref 13–44)
MCHC RBC-ENTMCNC: 26.5 PG — LOW (ref 27–34)
MCHC RBC-ENTMCNC: 33.1 % — SIGNIFICANT CHANGE UP (ref 32–36)
MCV RBC AUTO: 80 FL — SIGNIFICANT CHANGE UP (ref 80–100)
MONOCYTES # BLD AUTO: 2 K/UL — HIGH (ref 0–0.9)
MONOCYTES NFR BLD AUTO: 7.8 % — SIGNIFICANT CHANGE UP (ref 2–14)
NEUTROPHILS # BLD AUTO: 21.65 K/UL — HIGH (ref 1.8–7.4)
NEUTROPHILS NFR BLD AUTO: 84.9 % — HIGH (ref 43–77)
NITRITE UR-MCNC: NEGATIVE — SIGNIFICANT CHANGE UP
NRBC # FLD: 0 — SIGNIFICANT CHANGE UP
PH UR: 6 — SIGNIFICANT CHANGE UP (ref 4.6–8)
PLATELET # BLD AUTO: 229 K/UL — SIGNIFICANT CHANGE UP (ref 150–400)
PMV BLD: 9.2 FL — SIGNIFICANT CHANGE UP (ref 7–13)
POTASSIUM SERPL-MCNC: 4.5 MMOL/L — SIGNIFICANT CHANGE UP (ref 3.5–5.3)
POTASSIUM SERPL-SCNC: 4.5 MMOL/L — SIGNIFICANT CHANGE UP (ref 3.5–5.3)
PROCALCITONIN SERPL-MCNC: 13.43 NG/ML — HIGH (ref 0–0.04)
PROT SERPL-MCNC: 6.3 G/DL — SIGNIFICANT CHANGE UP (ref 6–8.3)
PROT UR-MCNC: 20 MG/DL — SIGNIFICANT CHANGE UP
RBC # BLD: 3.25 M/UL — LOW (ref 3.8–5.2)
RBC # FLD: 18.1 % — HIGH (ref 10.3–14.5)
RBC CASTS # UR COMP ASSIST: HIGH (ref 0–?)
SODIUM SERPL-SCNC: 128 MMOL/L — LOW (ref 135–145)
SP GR SPEC: 1.01 — SIGNIFICANT CHANGE UP (ref 1–1.04)
SPECIMEN SOURCE: SIGNIFICANT CHANGE UP
SPECIMEN SOURCE: SIGNIFICANT CHANGE UP
SQUAMOUS # UR AUTO: SIGNIFICANT CHANGE UP
UROBILINOGEN FLD QL: 2 MG/DL — HIGH
WBC # BLD: 25.5 K/UL — HIGH (ref 3.8–10.5)
WBC # FLD AUTO: 25.5 K/UL — HIGH (ref 3.8–10.5)
WBC UR QL: HIGH (ref 0–?)

## 2018-01-27 PROCEDURE — 99233 SBSQ HOSP IP/OBS HIGH 50: CPT | Mod: GC

## 2018-01-27 RX ORDER — CHLORHEXIDINE GLUCONATE 213 G/1000ML
1 SOLUTION TOPICAL ONCE
Qty: 0 | Refills: 0 | Status: COMPLETED | OUTPATIENT
Start: 2018-01-27 | End: 2018-01-27

## 2018-01-27 RX ADMIN — CEFEPIME 100 MILLIGRAM(S): 1 INJECTION, POWDER, FOR SOLUTION INTRAMUSCULAR; INTRAVENOUS at 01:53

## 2018-01-27 RX ADMIN — Medication 250 MILLIGRAM(S): at 15:00

## 2018-01-27 RX ADMIN — ENOXAPARIN SODIUM 40 MILLIGRAM(S): 100 INJECTION SUBCUTANEOUS at 12:36

## 2018-01-27 RX ADMIN — Medication 200 MILLIGRAM(S): at 21:37

## 2018-01-27 RX ADMIN — BENZOCAINE AND MENTHOL 1 LOZENGE: 5; 1 LIQUID ORAL at 19:33

## 2018-01-27 RX ADMIN — Medication 250 MILLIGRAM(S): at 02:57

## 2018-01-27 RX ADMIN — CHLORHEXIDINE GLUCONATE 1 APPLICATION(S): 213 SOLUTION TOPICAL at 15:28

## 2018-01-27 RX ADMIN — BENZOCAINE AND MENTHOL 1 LOZENGE: 5; 1 LIQUID ORAL at 06:23

## 2018-01-27 RX ADMIN — Medication 200 MILLIGRAM(S): at 06:23

## 2018-01-27 RX ADMIN — CEFEPIME 100 MILLIGRAM(S): 1 INJECTION, POWDER, FOR SOLUTION INTRAMUSCULAR; INTRAVENOUS at 10:55

## 2018-01-27 RX ADMIN — Medication 200 MILLIGRAM(S): at 12:36

## 2018-01-27 RX ADMIN — Medication 650 MILLIGRAM(S): at 15:28

## 2018-01-27 RX ADMIN — CEFEPIME 100 MILLIGRAM(S): 1 INJECTION, POWDER, FOR SOLUTION INTRAMUSCULAR; INTRAVENOUS at 18:31

## 2018-01-27 NOTE — PROVIDER CONTACT NOTE (OTHER) - ASSESSMENT
patient alert and oriented x4 able to make needs known. oob-ad yakelin. All other vital signs stable. family at bedside

## 2018-01-27 NOTE — PROGRESS NOTE ADULT - PROBLEM SELECTOR PLAN 1
- Pt with significant fevers, tachycardia, and leukocytosis but no identifiable source of infection  - Received vanc/cefepime/azithromycin in ED, while there is no identifiable sources of infection at this time given her significant symptoms would still cover her with empiric abx with vanc/cefepime, will hold azithromycin for now  - Her RVP is negative but given her constellation of symptoms (rhinorrhea, sore throat, cough) suspect she might still have a viral syndrome,   -Procalcitonin  13.43  - Will place on prn tylenol/robitussin/lozenge prns  - Consideration that her SIRS symptoms could be 2/2 her cancer but that would not explain her cough/sore throat/nasal congestion so this is less likely as a cause of her symptoms  - VS q4h  - suspect patient's CPK elevation might be related to her abdominal pain from cough/?myalgias, pt without chest pain and EKG without acute findings other than tachycardia, would repeat levels in AM  -fungal cultures ordered   -check ESR

## 2018-01-27 NOTE — PROGRESS NOTE ADULT - SUBJECTIVE AND OBJECTIVE BOX
CHIEF COMPLAINT:    Interval Events:    REVIEW OF SYSTEMS:  Constitutional:   Eyes:  ENT:  CV:  Resp:  GI:  :  MSK:  Integumentary:  Neurological:  Psychiatric:  Endocrine:  Hematologic/Lymphatic:  Allergic/Immunologic:  [ ] All other systems negative  [ ] Unable to assess ROS because ________    OBJECTIVE:  ICU Vital Signs Last 24 Hrs  T(C): 37.8 (2018 06:14), Max: 39.1 (2018 18:50)  T(F): 100.1 (2018 06:14), Max: 102.3 (2018 18:50)  HR: 118 (2018 06:14) (94 - 128)  BP: 124/76 (2018 06:14) (109/71 - 132/81)  BP(mean): --  ABP: --  ABP(mean): --  RR: 18 (2018 06:14) (18 - 18)  SpO2: 100% (2018 06:14) (97% - 100%)        CAPILLARY BLOOD GLUCOSE      POCT Blood Glucose.: 106 mg/dL (2018 22:18)      PHYSICAL EXAM:  General:   HEENT:   Lymph Nodes:  Neck:   Respiratory:   Cardiovascular:   Abdomen:   Extremities:   Skin:   Neurological:  Psychiatry:    HOSPITAL MEDICATIONS:  MEDICATIONS  (STANDING):  cefepime  IVPB 2000 milliGRAM(s) IV Intermittent every 8 hours  enoxaparin Injectable 40 milliGRAM(s) SubCutaneous every 24 hours  sodium chloride 0.9%. 1000 milliLiter(s) (50 mL/Hr) IV Continuous <Continuous>  vancomycin  IVPB 1000 milliGRAM(s) IV Intermittent every 12 hours    MEDICATIONS  (PRN):  acetaminophen   Tablet 650 milliGRAM(s) Oral every 6 hours PRN For Temp greater than 38.5 C (101.3 F)  benzocaine 15 mG/menthol 3.6 mG Lozenge 1 Lozenge Oral every 1 hour PRN Sore Throat  guaiFENesin    Syrup 200 milliGRAM(s) Oral every 6 hours PRN Cough      LABS:                        8.6    25.50 )-----------( 229      ( 2018 05:30 )             26.0     01-27    128<L>  |  93<L>  |  21  ----------------------------<  101<H>  4.5   |  23  |  0.75    Ca    8.5      2018 05:30  Phos  3.3       Mg     1.6         TPro  6.3  /  Alb  2.3<L>  /  TBili  2.4<H>  /  DBili  x   /  AST  159<H>  /  ALT  35<H>  /  AlkPhos  284<H>      PT/INR - ( 2018 21:15 )   PT: 16.8 SEC;   INR: 1.45          PTT - ( 2018 21:15 )  PTT:28.1 SEC  Urinalysis Basic - ( 2018 04:40 )    Color: YELLOW / Appearance: HAZY / S.014 / pH: 6.0  Gluc: NEGATIVE / Ketone: NEGATIVE  / Bili: NEGATIVE / Urobili: 2 mg/dL   Blood: NEGATIVE / Protein: 20 mg/dL / Nitrite: NEGATIVE   Leuk Esterase: NEGATIVE / RBC: 5-10 / WBC 5-10   Sq Epi: OCC / Non Sq Epi: x / Bacteria: FEW        Venous Blood Gas:   @ 21:15  7.49/36/72/28/95.1  VBG Lactate: 1.1      MICROBIOLOGY:     RADIOLOGY:  [ ] Reviewed and interpreted by me    PULMONARY FUNCTION TESTS:    EKG: CHIEF COMPLAINT: Patient is a 72y old  Female who presents with a chief complaint of Fevers, cough, generalized weakness (2018 05:38)      Interval Events: Fever 102 last pm     REVIEW OF SYSTEMS:  Constitutional: Feels well, no fever or chills, no lethergy   Eyes:  ENT:  CV: Denies   Resp: + cough   GI: NO n/v no diarrhea   : No urinary sx   MSK:  Integumentary:  Neurological:  Psychiatric:  Endocrine:  Hematologic/Lymphatic:  Allergic/Immunologic:  [ x] All other systems negative  [ ] Unable to assess ROS because ________    OBJECTIVE:  ICU Vital Signs Last 24 Hrs  T(C): 37.8 (2018 06:14), Max: 39.1 (2018 18:50)  T(F): 100.1 (2018 06:14), Max: 102.3 (2018 18:50)  HR: 118 (2018 06:14) (94 - 128)  BP: 124/76 (2018 06:14) (109/71 - 132/81)  BP(mean): --  ABP: --  ABP(mean): --  RR: 18 (2018 06:14) (18 - 18)  SpO2: 100% (2018 06:14) (97% - 100%)        CAPILLARY BLOOD GLUCOSE      POCT Blood Glucose.: 106 mg/dL (2018 22:18)    HOSPITAL MEDICATIONS:  MEDICATIONS  (STANDING):  cefepime  IVPB 2000 milliGRAM(s) IV Intermittent every 8 hours  enoxaparin Injectable 40 milliGRAM(s) SubCutaneous every 24 hours  sodium chloride 0.9%. 1000 milliLiter(s) (50 mL/Hr) IV Continuous <Continuous>  vancomycin  IVPB 1000 milliGRAM(s) IV Intermittent every 12 hours    MEDICATIONS  (PRN):  acetaminophen   Tablet 650 milliGRAM(s) Oral every 6 hours PRN For Temp greater than 38.5 C (101.3 F)  benzocaine 15 mG/menthol 3.6 mG Lozenge 1 Lozenge Oral every 1 hour PRN Sore Throat  guaiFENesin    Syrup 200 milliGRAM(s) Oral every 6 hours PRN Cough      LABS:                        8.6    25.50 )-----------( 229      ( 2018 05:30 )             26.0     01-27    128<L>  |  93<L>  |  21  ----------------------------<  101<H>  4.5   |  23  |  0.75    Ca    8.5      2018 05:30  Phos  3.3       Mg     1.6         TPro  6.3  /  Alb  2.3<L>  /  TBili  2.4<H>  /  DBili  x   /  AST  159<H>  /  ALT  35<H>  /  AlkPhos  284<H>      PT/INR - ( 2018 21:15 )   PT: 16.8 SEC;   INR: 1.45          PTT - ( 2018 21:15 )  PTT:28.1 SEC  Urinalysis Basic - ( 2018 04:40 )    Color: YELLOW / Appearance: HAZY / S.014 / pH: 6.0  Gluc: NEGATIVE / Ketone: NEGATIVE  / Bili: NEGATIVE / Urobili: 2 mg/dL   Blood: NEGATIVE / Protein: 20 mg/dL / Nitrite: NEGATIVE   Leuk Esterase: NEGATIVE / RBC: 5-10 / WBC 5-10   Sq Epi: OCC / Non Sq Epi: x / Bacteria: FEW        Venous Blood Gas:   @ 21:15  7.49/36/72/28/95.1  VBG Lactate: 1.1      MICROBIOLOGY:     RADIOLOGY:  [ ] Reviewed and interpreted by me    PULMONARY FUNCTION TESTS:    EKG:

## 2018-01-27 NOTE — PROVIDER CONTACT NOTE (OTHER) - BACKGROUND
patient admitted for fever/spesis work-up. continues with fevers on-and-off.  noted with temp  yesterday. blood cultures drawn.

## 2018-01-28 LAB
ALBUMIN SERPL ELPH-MCNC: 2 G/DL — LOW (ref 3.3–5)
ALP SERPL-CCNC: 250 U/L — HIGH (ref 40–120)
ALT FLD-CCNC: 25 U/L — SIGNIFICANT CHANGE UP (ref 4–33)
AST SERPL-CCNC: 82 U/L — HIGH (ref 4–32)
BACTERIA UR CULT: SIGNIFICANT CHANGE UP
BASOPHILS # BLD AUTO: 0.06 K/UL — SIGNIFICANT CHANGE UP (ref 0–0.2)
BASOPHILS NFR BLD AUTO: 0.2 % — SIGNIFICANT CHANGE UP (ref 0–2)
BILIRUB SERPL-MCNC: 2.3 MG/DL — HIGH (ref 0.2–1.2)
BUN SERPL-MCNC: 21 MG/DL — SIGNIFICANT CHANGE UP (ref 7–23)
CALCIUM SERPL-MCNC: 8.6 MG/DL — SIGNIFICANT CHANGE UP (ref 8.4–10.5)
CHLORIDE SERPL-SCNC: 94 MMOL/L — LOW (ref 98–107)
CO2 SERPL-SCNC: 22 MMOL/L — SIGNIFICANT CHANGE UP (ref 22–31)
CREAT SERPL-MCNC: 0.81 MG/DL — SIGNIFICANT CHANGE UP (ref 0.5–1.3)
EOSINOPHIL # BLD AUTO: 0.06 K/UL — SIGNIFICANT CHANGE UP (ref 0–0.5)
EOSINOPHIL NFR BLD AUTO: 0.2 % — SIGNIFICANT CHANGE UP (ref 0–6)
GLUCOSE SERPL-MCNC: 79 MG/DL — SIGNIFICANT CHANGE UP (ref 70–99)
HCT VFR BLD CALC: 24.9 % — LOW (ref 34.5–45)
HGB BLD-MCNC: 8.5 G/DL — LOW (ref 11.5–15.5)
IMM GRANULOCYTES # BLD AUTO: 0.51 # — SIGNIFICANT CHANGE UP
IMM GRANULOCYTES NFR BLD AUTO: 1.8 % — HIGH (ref 0–1.5)
LYMPHOCYTES # BLD AUTO: 1.83 K/UL — SIGNIFICANT CHANGE UP (ref 1–3.3)
LYMPHOCYTES # BLD AUTO: 6.6 % — LOW (ref 13–44)
MCHC RBC-ENTMCNC: 28 PG — SIGNIFICANT CHANGE UP (ref 27–34)
MCHC RBC-ENTMCNC: 34.1 % — SIGNIFICANT CHANGE UP (ref 32–36)
MCV RBC AUTO: 81.9 FL — SIGNIFICANT CHANGE UP (ref 80–100)
MONOCYTES # BLD AUTO: 2.11 K/UL — HIGH (ref 0–0.9)
MONOCYTES NFR BLD AUTO: 7.6 % — SIGNIFICANT CHANGE UP (ref 2–14)
NEUTROPHILS # BLD AUTO: 23.26 K/UL — HIGH (ref 1.8–7.4)
NEUTROPHILS NFR BLD AUTO: 83.6 % — HIGH (ref 43–77)
NRBC # FLD: 0 — SIGNIFICANT CHANGE UP
OSMOLALITY SERPL: 270 MOSMO/KG — LOW (ref 275–295)
PLATELET # BLD AUTO: 267 K/UL — SIGNIFICANT CHANGE UP (ref 150–400)
PMV BLD: 10.4 FL — SIGNIFICANT CHANGE UP (ref 7–13)
POTASSIUM SERPL-MCNC: 4.2 MMOL/L — SIGNIFICANT CHANGE UP (ref 3.5–5.3)
POTASSIUM SERPL-SCNC: 4.2 MMOL/L — SIGNIFICANT CHANGE UP (ref 3.5–5.3)
PROT SERPL-MCNC: 6.3 G/DL — SIGNIFICANT CHANGE UP (ref 6–8.3)
RBC # BLD: 3.04 M/UL — LOW (ref 3.8–5.2)
RBC # FLD: 18.4 % — HIGH (ref 10.3–14.5)
SODIUM SERPL-SCNC: 127 MMOL/L — LOW (ref 135–145)
SPECIMEN SOURCE: SIGNIFICANT CHANGE UP
SPECIMEN SOURCE: SIGNIFICANT CHANGE UP
VANCOMYCIN TROUGH SERPL-MCNC: 18.8 UG/ML — SIGNIFICANT CHANGE UP (ref 10–20)
WBC # BLD: 27.83 K/UL — HIGH (ref 3.8–10.5)
WBC # FLD AUTO: 27.83 K/UL — HIGH (ref 3.8–10.5)

## 2018-01-28 PROCEDURE — 99233 SBSQ HOSP IP/OBS HIGH 50: CPT | Mod: GC

## 2018-01-28 RX ORDER — ACETAMINOPHEN 500 MG
650 TABLET ORAL EVERY 6 HOURS
Qty: 0 | Refills: 0 | Status: DISCONTINUED | OUTPATIENT
Start: 2018-01-28 | End: 2018-01-31

## 2018-01-28 RX ORDER — CHLORHEXIDINE GLUCONATE 213 G/1000ML
1 SOLUTION TOPICAL DAILY
Qty: 0 | Refills: 0 | Status: DISCONTINUED | OUTPATIENT
Start: 2018-01-28 | End: 2018-01-31

## 2018-01-28 RX ADMIN — Medication 100 MILLIGRAM(S): at 21:30

## 2018-01-28 RX ADMIN — BENZOCAINE AND MENTHOL 1 LOZENGE: 5; 1 LIQUID ORAL at 16:30

## 2018-01-28 RX ADMIN — Medication 650 MILLIGRAM(S): at 01:27

## 2018-01-28 RX ADMIN — CEFEPIME 100 MILLIGRAM(S): 1 INJECTION, POWDER, FOR SOLUTION INTRAMUSCULAR; INTRAVENOUS at 00:55

## 2018-01-28 RX ADMIN — Medication 200 MILLIGRAM(S): at 13:44

## 2018-01-28 RX ADMIN — Medication 650 MILLIGRAM(S): at 01:57

## 2018-01-28 RX ADMIN — CEFEPIME 100 MILLIGRAM(S): 1 INJECTION, POWDER, FOR SOLUTION INTRAMUSCULAR; INTRAVENOUS at 10:15

## 2018-01-28 RX ADMIN — ENOXAPARIN SODIUM 40 MILLIGRAM(S): 100 INJECTION SUBCUTANEOUS at 13:44

## 2018-01-28 RX ADMIN — Medication 250 MILLIGRAM(S): at 01:28

## 2018-01-28 RX ADMIN — CHLORHEXIDINE GLUCONATE 1 APPLICATION(S): 213 SOLUTION TOPICAL at 16:28

## 2018-01-28 RX ADMIN — Medication 250 MILLIGRAM(S): at 16:27

## 2018-01-28 RX ADMIN — CEFEPIME 100 MILLIGRAM(S): 1 INJECTION, POWDER, FOR SOLUTION INTRAMUSCULAR; INTRAVENOUS at 17:44

## 2018-01-28 NOTE — PROGRESS NOTE ADULT - PROBLEM SELECTOR PLAN 1
- Pt with significant fevers, tachycardia, and leukocytosis but no identifiable source of infection.   -No fever orvernight   - Receiving vanc/cefepime , while there is no identifiable sources of infection at this time given her significant symptoms would still cover her with empiric abx with vanc/cefepime, will hold azithromycin for now  -Viral sx have improved/gone   -Procalcitonin  13.43  - Will place on prn tylenol/robitussin/lozenge prns  - Consideration that her SIRS symptoms could be 2/2 her cancer but that would not explain her cough/sore throat/nasal congestion so this is less likely as a cause of her symptoms  - VS q4h  - suspect patient's CPK elevation might be related to her abdominal pain from cough/?myalgias, pt without chest pain and EKG without acute findings other than tachycardia, would repeat levels in AM  -fungal cultures done and pending   - ESR- 105  - tessalon pearls for cough

## 2018-01-28 NOTE — PROGRESS NOTE ADULT - SUBJECTIVE AND OBJECTIVE BOX
CHIEF COMPLAINT:    Interval Events:    REVIEW OF SYSTEMS:  Constitutional:   Eyes:  ENT:  CV:  Resp:  GI:  :  MSK:  Integumentary:  Neurological:  Psychiatric:  Endocrine:  Hematologic/Lymphatic:  Allergic/Immunologic:  [ ] All other systems negative  [ ] Unable to assess ROS because ________    OBJECTIVE:  ICU Vital Signs Last 24 Hrs  T(C): 36.8 (2018 05:20), Max: 38.4 (2018 14:59)  T(F): 98.2 (2018 05:20), Max: 101.1 (2018 14:59)  HR: 93 (2018 05:20) (93 - 125)  BP: 107/57 (2018 05:20) (107/57 - 122/66)  BP(mean): --  ABP: --  ABP(mean): --  RR: 18 (2018 05:20) (18 - 18)  SpO2: 100% (2018 05:20) (100% - 100%)        CAPILLARY BLOOD GLUCOSE          PHYSICAL EXAM:  General:   HEENT:   Lymph Nodes:  Neck:   Respiratory:   Cardiovascular:   Abdomen:   Extremities:   Skin:   Neurological:  Psychiatry:    HOSPITAL MEDICATIONS:  MEDICATIONS  (STANDING):  cefepime  IVPB 2000 milliGRAM(s) IV Intermittent every 8 hours  enoxaparin Injectable 40 milliGRAM(s) SubCutaneous every 24 hours  sodium chloride 0.9%. 1000 milliLiter(s) (50 mL/Hr) IV Continuous <Continuous>  vancomycin  IVPB 1000 milliGRAM(s) IV Intermittent every 12 hours    MEDICATIONS  (PRN):  acetaminophen   Tablet 650 milliGRAM(s) Oral every 6 hours PRN For Temp greater than 38.5 C (101.3 F)  acetaminophen   Tablet. 650 milliGRAM(s) Oral every 6 hours PRN Moderate Pain (4 - 6)  benzocaine 15 mG/menthol 3.6 mG Lozenge 1 Lozenge Oral every 1 hour PRN Sore Throat  guaiFENesin    Syrup 200 milliGRAM(s) Oral every 6 hours PRN Cough      LABS:                        8.5    27.83 )-----------( 267      ( 2018 07:25 )             24.9     01-28    127<L>  |  94<L>  |  21  ----------------------------<  79  4.2   |  22  |  0.81    Ca    8.6      2018 06:35  Phos  3.3       Mg     1.6         TPro  6.3  /  Alb  2.0<L>  /  TBili  2.3<H>  /  DBili  x   /  AST  82<H>  /  ALT  25  /  AlkPhos  250<H>        Urinalysis Basic - ( 2018 04:40 )    Color: YELLOW / Appearance: HAZY / S.014 / pH: 6.0  Gluc: NEGATIVE / Ketone: NEGATIVE  / Bili: NEGATIVE / Urobili: 2 mg/dL   Blood: NEGATIVE / Protein: 20 mg/dL / Nitrite: NEGATIVE   Leuk Esterase: NEGATIVE / RBC: 5-10 / WBC 5-10   Sq Epi: OCC / Non Sq Epi: x / Bacteria: FEW            MICROBIOLOGY:     RADIOLOGY:  [ ] Reviewed and interpreted by me    PULMONARY FUNCTION TESTS:    EKG: CHIEF COMPLAINT: Patient is a 72y old  Female who presents with a chief complaint of Fevers, cough, generalized weakness (2018 05:38)      Interval Events: none     REVIEW OF SYSTEMS:  Constitutional: Feeling better   Eyes:  ENT:  CV: Denies  Resp: Denies   GI:  :  MSK:  [x ] All other systems negative  [ ] Unable to assess ROS because ________    OBJECTIVE:  ICU Vital Signs Last 24 Hrs  T(C): 36.8 (2018 05:20), Max: 38.4 (2018 14:59)  T(F): 98.2 (2018 05:20), Max: 101.1 (2018 14:59)  HR: 93 (2018 05:20) (93 - 125)  BP: 107/57 (2018 05:20) (107/57 - 122/66)  BP(mean): --  ABP: --  ABP(mean): --  RR: 18 (2018 05:20) (18 - 18)  SpO2: 100% (2018 05:20) (100% - 100%)        CAPILLARY BLOOD GLUCOSE        HOSPITAL MEDICATIONS:  MEDICATIONS  (STANDING):  cefepime  IVPB 2000 milliGRAM(s) IV Intermittent every 8 hours  enoxaparin Injectable 40 milliGRAM(s) SubCutaneous every 24 hours  sodium chloride 0.9%. 1000 milliLiter(s) (50 mL/Hr) IV Continuous <Continuous>  vancomycin  IVPB 1000 milliGRAM(s) IV Intermittent every 12 hours    MEDICATIONS  (PRN):  acetaminophen   Tablet 650 milliGRAM(s) Oral every 6 hours PRN For Temp greater than 38.5 C (101.3 F)  acetaminophen   Tablet. 650 milliGRAM(s) Oral every 6 hours PRN Moderate Pain (4 - 6)  benzocaine 15 mG/menthol 3.6 mG Lozenge 1 Lozenge Oral every 1 hour PRN Sore Throat  guaiFENesin    Syrup 200 milliGRAM(s) Oral every 6 hours PRN Cough      LABS:                        8.5    27.83 )-----------( 267      ( 2018 07:25 )             24.9     01-28    127<L>  |  94<L>  |  21  ----------------------------<  79  4.2   |  22  |  0.81    Ca    8.6      2018 06:35  Phos  3.3       Mg     1.6         TPro  6.3  /  Alb  2.0<L>  /  TBili  2.3<H>  /  DBili  x   /  AST  82<H>  /  ALT  25  /  AlkPhos  250<H>        Urinalysis Basic - ( 2018 04:40 )    Color: YELLOW / Appearance: HAZY / S.014 / pH: 6.0  Gluc: NEGATIVE / Ketone: NEGATIVE  / Bili: NEGATIVE / Urobili: 2 mg/dL   Blood: NEGATIVE / Protein: 20 mg/dL / Nitrite: NEGATIVE   Leuk Esterase: NEGATIVE / RBC: 5-10 / WBC 5-10   Sq Epi: OCC / Non Sq Epi: x / Bacteria: FEW            MICROBIOLOGY:     RADIOLOGY:  [ ] Reviewed and interpreted by me    PULMONARY FUNCTION TESTS:    EKG:

## 2018-01-28 NOTE — PROGRESS NOTE ADULT - PROBLEM SELECTOR PLAN 2
- outpatient f/u with her oncologist  - Suspect patient's transaminitis is 2/2 to her liver mets, would monitor levels   -Pt had PET scan which showed only liver lesions per pt as OP

## 2018-01-29 ENCOUNTER — MOBILE ON CALL (OUTPATIENT)
Age: 73
End: 2018-01-29

## 2018-01-29 DIAGNOSIS — R52 PAIN, UNSPECIFIED: ICD-10-CM

## 2018-01-29 DIAGNOSIS — Z51.5 ENCOUNTER FOR PALLIATIVE CARE: ICD-10-CM

## 2018-01-29 LAB
ALBUMIN SERPL ELPH-MCNC: 2 G/DL — LOW (ref 3.3–5)
ALP SERPL-CCNC: 235 U/L — HIGH (ref 40–120)
ALT FLD-CCNC: 18 U/L — SIGNIFICANT CHANGE UP (ref 4–33)
AST SERPL-CCNC: 51 U/L — HIGH (ref 4–32)
BASOPHILS # BLD AUTO: 0.05 K/UL — SIGNIFICANT CHANGE UP (ref 0–0.2)
BASOPHILS NFR BLD AUTO: 0.2 % — SIGNIFICANT CHANGE UP (ref 0–2)
BILIRUB SERPL-MCNC: 1.5 MG/DL — HIGH (ref 0.2–1.2)
BUN SERPL-MCNC: 20 MG/DL — SIGNIFICANT CHANGE UP (ref 7–23)
CALCIUM SERPL-MCNC: 8.5 MG/DL — SIGNIFICANT CHANGE UP (ref 8.4–10.5)
CHLORIDE SERPL-SCNC: 95 MMOL/L — LOW (ref 98–107)
CO2 SERPL-SCNC: 22 MMOL/L — SIGNIFICANT CHANGE UP (ref 22–31)
CREAT SERPL-MCNC: 0.77 MG/DL — SIGNIFICANT CHANGE UP (ref 0.5–1.3)
EOSINOPHIL # BLD AUTO: 0.12 K/UL — SIGNIFICANT CHANGE UP (ref 0–0.5)
EOSINOPHIL NFR BLD AUTO: 0.5 % — SIGNIFICANT CHANGE UP (ref 0–6)
GLUCOSE SERPL-MCNC: 130 MG/DL — HIGH (ref 70–99)
HCT VFR BLD CALC: 23.6 % — LOW (ref 34.5–45)
HGB BLD-MCNC: 7.8 G/DL — LOW (ref 11.5–15.5)
IMM GRANULOCYTES # BLD AUTO: 0.28 # — SIGNIFICANT CHANGE UP
IMM GRANULOCYTES NFR BLD AUTO: 1.3 % — SIGNIFICANT CHANGE UP (ref 0–1.5)
LYMPHOCYTES # BLD AUTO: 1.91 K/UL — SIGNIFICANT CHANGE UP (ref 1–3.3)
LYMPHOCYTES # BLD AUTO: 8.6 % — LOW (ref 13–44)
MCHC RBC-ENTMCNC: 26.7 PG — LOW (ref 27–34)
MCHC RBC-ENTMCNC: 33.1 % — SIGNIFICANT CHANGE UP (ref 32–36)
MCV RBC AUTO: 80.8 FL — SIGNIFICANT CHANGE UP (ref 80–100)
MONOCYTES # BLD AUTO: 2.24 K/UL — HIGH (ref 0–0.9)
MONOCYTES NFR BLD AUTO: 10.1 % — SIGNIFICANT CHANGE UP (ref 2–14)
NEUTROPHILS # BLD AUTO: 17.65 K/UL — HIGH (ref 1.8–7.4)
NEUTROPHILS NFR BLD AUTO: 79.3 % — HIGH (ref 43–77)
NRBC # FLD: 0 — SIGNIFICANT CHANGE UP
OSMOLALITY UR: 230 MOSMO/KG — SIGNIFICANT CHANGE UP (ref 50–1200)
PLATELET # BLD AUTO: 304 K/UL — SIGNIFICANT CHANGE UP (ref 150–400)
PMV BLD: 10.2 FL — SIGNIFICANT CHANGE UP (ref 7–13)
POTASSIUM SERPL-MCNC: 4.2 MMOL/L — SIGNIFICANT CHANGE UP (ref 3.5–5.3)
POTASSIUM SERPL-SCNC: 4.2 MMOL/L — SIGNIFICANT CHANGE UP (ref 3.5–5.3)
PROT SERPL-MCNC: 6 G/DL — SIGNIFICANT CHANGE UP (ref 6–8.3)
RBC # BLD: 2.92 M/UL — LOW (ref 3.8–5.2)
RBC # FLD: 17.9 % — HIGH (ref 10.3–14.5)
REVIEW TO FOLLOW: YES — SIGNIFICANT CHANGE UP
SODIUM SERPL-SCNC: 128 MMOL/L — LOW (ref 135–145)
SODIUM UR-SCNC: 6 MMOL/L — SIGNIFICANT CHANGE UP
WBC # BLD: 22.25 K/UL — HIGH (ref 3.8–10.5)
WBC # FLD AUTO: 22.25 K/UL — HIGH (ref 3.8–10.5)

## 2018-01-29 PROCEDURE — 99222 1ST HOSP IP/OBS MODERATE 55: CPT | Mod: GC

## 2018-01-29 PROCEDURE — 99222 1ST HOSP IP/OBS MODERATE 55: CPT

## 2018-01-29 PROCEDURE — 99233 SBSQ HOSP IP/OBS HIGH 50: CPT | Mod: GC

## 2018-01-29 RX ADMIN — CEFEPIME 100 MILLIGRAM(S): 1 INJECTION, POWDER, FOR SOLUTION INTRAMUSCULAR; INTRAVENOUS at 18:21

## 2018-01-29 RX ADMIN — CEFEPIME 100 MILLIGRAM(S): 1 INJECTION, POWDER, FOR SOLUTION INTRAMUSCULAR; INTRAVENOUS at 09:46

## 2018-01-29 RX ADMIN — Medication 100 MILLIGRAM(S): at 12:59

## 2018-01-29 RX ADMIN — Medication 250 MILLIGRAM(S): at 15:03

## 2018-01-29 RX ADMIN — ENOXAPARIN SODIUM 40 MILLIGRAM(S): 100 INJECTION SUBCUTANEOUS at 12:58

## 2018-01-29 RX ADMIN — BENZOCAINE AND MENTHOL 1 LOZENGE: 5; 1 LIQUID ORAL at 18:24

## 2018-01-29 RX ADMIN — CHLORHEXIDINE GLUCONATE 1 APPLICATION(S): 213 SOLUTION TOPICAL at 12:59

## 2018-01-29 RX ADMIN — Medication 100 MILLIGRAM(S): at 21:14

## 2018-01-29 RX ADMIN — CEFEPIME 100 MILLIGRAM(S): 1 INJECTION, POWDER, FOR SOLUTION INTRAMUSCULAR; INTRAVENOUS at 01:21

## 2018-01-29 RX ADMIN — Medication 250 MILLIGRAM(S): at 04:55

## 2018-01-29 RX ADMIN — Medication 100 MILLIGRAM(S): at 05:36

## 2018-01-29 NOTE — CONSULT NOTE ADULT - ATTENDING COMMENTS
Pt seen with NP.  Agree with above.  HCP form given to pt to fill out.  As per pt, her son would be her hcp.  Goals as above.

## 2018-01-29 NOTE — CONSULT NOTE ADULT - SUBJECTIVE AND OBJECTIVE BOX
Patient is a 72y old  Female who presents with a chief complaint of Fevers, cough, generalized weakness (26 Jan 2018 05:38)    HPI:  72F with history of R breast cancer diagnosed 2002 s/p mastectomy/implant 2003 and HTN.  Admitted 1/26 wtih fever/cough and weakness.  Some associated generalized weakness.  Chart says sore throat, however, to me she denied.  No sick contacts.  No travel.  She was recently admitted 1/16/18 until 1/19/18 for SOB/cough.  Negative work-up at that time, however, received levaquin with improvement.       Started on vancomycin and cefepime (today is D#4).  She feels better.  Fever has resolved.  Appetite remains low.  Of note, she has known metastases to the liver.  Rest of ROS otherwise is negative.        PAST MEDICAL & SURGICAL HISTORY:  Malignant neoplasm of right female breast, unspecified estrogen receptor status, unspecified site of breast: s/p mastectomy  Hypertension  S/P mastectomy, right: 2003 (as well as implant)    Allergies  No Known Allergies    ANTIMICROBIALS:    cefepime  IVPB 2000 every 8 hours (1/26-)  vancomycin  IVPB 1000 every 12 hours (1/26-)    OTHER MEDS: MEDICATIONS  (STANDING):  benzonatate 100 three times a day  enoxaparin Injectable 40 every 24 hours    SOCIAL HISTORY:   lives with son and daughter    FAMILY HISTORY:  Diabetes; denies autoimmune diseases.    REVIEW OF SYSTEMS  [  ] ROS unobtainable because:    [ x ] All other systems negative except as noted below:	    Constitutional:  [x] fever [x] weight loss  Skin:  [ ] rash [ ] phlebitis	  Eyes: [ ] icterus [ ] inflammation	  ENMT: [ ] discharge [ ] thrush [ ] ulcers [ ] exudates  Respiratory: [ ] dyspnea [ ] hemoptysis [x ] cough [ ] sputum	  Cardiovascular:  [ ] chest pain [ ] palpitations [ ] edema	  Gastrointestinal:  [ ] nausea [ ] vomiting [ ] diarrhea [ ] constipation [ ] pain	  Genitourinary:  [ ] dysuria [ ] frequency [ ] hematuria [ ] discharge [ ] flank pain  Musculoskeletal:  [ ] myalgias [ ] arthralgias [ ] arthritis	  Neurological:  [ ] headache [ ] seizures	  Psychiatric:  [ ] anxiety [ ] depression	  Hematology/Lymphatics:  [ ] lymphadenopathy  Endocrine:  [ ] adrenal [ ] thyroid  Allergic/Immunologic:	 [ ] transplant [ ] seasonal    Vital Signs Last 24 Hrs  T(F): 97.9 (01-29-18 @ 09:45), Max: 102.5 (01-25-18 @ 19:29)    Vital Signs Last 24 Hrs  HR: 97 (01-29-18 @ 13:02) (97 - 110)  BP: 136/78 (01-29-18 @ 13:02) (122/68 - 136/78)  RR: 18 (01-29-18 @ 13:02)  SpO2: 100% (01-29-18 @ 13:02) (100% - 100%)  Wt(kg): --    PHYSICAL EXAM:  General: non-toxic  HEAD/EYES: anicteric, PERRL  ENT:  supple  Cardiovascular:   S1, S2; chest with implant - no erythema, warmth or tenderness  Respiratory:  clear bilaterally  GI:  soft, non-tender, normal bowel sounds  :  no CVA tenderness  Musculoskeletal:  no synovitis  Neurologic:  grossly non-focal  Skin:  no rash  Lymph: no lymphadenopathy  Psychiatric:  appropriate affect  Vascular:  no phlebitis               7.8    22.25 )-----------( 304      ( 29 Jan 2018 05:30 )             23.6     128<L>  |  95<L>  |  20  ----------------------------<  130<H>  4.2   |  22  |  0.77    Ca    8.5      29 Jan 2018 05:30    TPro  6.0  /  Alb  2.0<L>  /  TBili  1.5<H>  /  DBili  x   /  AST  51<H>  /  ALT  18  /  AlkPhos  235<H>  01-29    MICROBIOLOGY:  Culture - Fungal, Blood (collected 01-27-18 @ 16:05)  Source: BLOOD PERIPHERAL  Preliminary Report (01-28-18 @ 16:04):    CULTURE NEGATIVE FOR YEASTS AND MOLDS-PRELIMINARY RESULT    AFTER 24 HOURS INCUBATION    Culture - Urine (collected 01-27-18 @ 04:41)  Source: URINE MIDSTREAM  Final Report (01-28-18 @ 08:07):    NO GROWTH AT 24 HOURS    Culture - Blood (collected 01-26-18 @ 21:30)  Source: BLOOD VENOUS  Preliminary Report (01-28-18 @ 21:29):    NO ORGANISMS ISOLATED    NO ORGANISMS ISOLATED AT 48 HRS.    Culture - Blood (collected 01-26-18 @ 20:44)  Source: BLOOD PERIPHERAL  Preliminary Report (01-28-18 @ 20:43):    NO ORGANISMS ISOLATED    NO ORGANISMS ISOLATED AT 48 HRS.    Vancomycin Level, Trough: 18.8 ug/mL (01-28-18 @ 14:33)    RADIOLOGY:  Xray Chest 1 View AP- PORTABLE-Urgent (01.26.18 @ 01:01)   FINDINGS:  The heart size is normal.   Low lung volumes. No focal consolidations. There are no pleural effusions or pneumothorax.  Degenerative changes of the thoracic spine.  Calcified right breast implant.  IMPRESSION:   Low lung volumes. No focal consolidations.     VA Duplex Ext Veins Lower Comp, Bilat. (01.18.18 @ 12:40)  Summary/Impressions:  1.  No evidence of deep venous thrombosis in the right and left lower extremities.  2.  No evidence of deep and superficial venous insufficiency noted in the right and left lower extremities.

## 2018-01-29 NOTE — CONSULT NOTE ADULT - PROBLEM SELECTOR PROBLEM 2
Malignant neoplasm of right female breast, unspecified estrogen receptor status, unspecified site of breast Pain

## 2018-01-29 NOTE — PROGRESS NOTE ADULT - SUBJECTIVE AND OBJECTIVE BOX
CHIEF COMPLAINT:    Interval Events:    REVIEW OF SYSTEMS:  Constitutional:   Eyes:  ENT:  CV:  Resp:  GI:  :  MSK:  Integumentary:  Neurological:  Psychiatric:  Endocrine:  Hematologic/Lymphatic:  Allergic/Immunologic:  [ ] All other systems negative  [ ] Unable to assess ROS because ________    OBJECTIVE:  ICU Vital Signs Last 24 Hrs  T(C): 36.9 (29 Jan 2018 05:35), Max: 37.6 (28 Jan 2018 21:29)  T(F): 98.4 (29 Jan 2018 05:35), Max: 99.7 (28 Jan 2018 21:29)  HR: 103 (29 Jan 2018 05:35) (103 - 115)  BP: 135/78 (29 Jan 2018 05:35) (122/68 - 135/78)  BP(mean): --  ABP: --  ABP(mean): --  RR: 18 (29 Jan 2018 05:35) (16 - 18)  SpO2: 100% (29 Jan 2018 05:35) (100% - 100%)        CAPILLARY BLOOD GLUCOSE          PHYSICAL EXAM:  General:   HEENT:   Lymph Nodes:  Neck:   Respiratory:   Cardiovascular:   Abdomen:   Extremities:   Skin:   Neurological:  Psychiatry:    HOSPITAL MEDICATIONS:  MEDICATIONS  (STANDING):  benzonatate 100 milliGRAM(s) Oral three times a day  cefepime  IVPB 2000 milliGRAM(s) IV Intermittent every 8 hours  chlorhexidine 4% Liquid 1 Application(s) Topical daily  enoxaparin Injectable 40 milliGRAM(s) SubCutaneous every 24 hours  sodium chloride 0.9%. 1000 milliLiter(s) (50 mL/Hr) IV Continuous <Continuous>  vancomycin  IVPB 1000 milliGRAM(s) IV Intermittent every 12 hours    MEDICATIONS  (PRN):  acetaminophen   Tablet 650 milliGRAM(s) Oral every 6 hours PRN For Temp greater than 38.5 C (101.3 F)  acetaminophen   Tablet. 650 milliGRAM(s) Oral every 6 hours PRN Moderate Pain (4 - 6)  benzocaine 15 mG/menthol 3.6 mG Lozenge 1 Lozenge Oral every 1 hour PRN Sore Throat  guaiFENesin    Syrup 200 milliGRAM(s) Oral every 6 hours PRN Cough      LABS:                        7.8    22.25 )-----------( 304      ( 29 Jan 2018 05:30 )             23.6     01-29    128<L>  |  95<L>  |  20  ----------------------------<  130<H>  4.2   |  22  |  0.77    Ca    8.5      29 Jan 2018 05:30    TPro  6.0  /  Alb  2.0<L>  /  TBili  1.5<H>  /  DBili  x   /  AST  51<H>  /  ALT  18  /  AlkPhos  235<H>  01-29              MICROBIOLOGY:     RADIOLOGY:  [ ] Reviewed and interpreted by me    PULMONARY FUNCTION TESTS:    EKG: CHIEF COMPLAINT: Patient is a 72y old  Female who presents with a chief complaint of Fevers, cough, generalized weakness (26 Jan 2018 05:38)    Interval Events: no acute events    OBJECTIVE:  ICU Vital Signs Last 24 Hrs  T(C): 36.9 (29 Jan 2018 05:35), Max: 37.6 (28 Jan 2018 21:29)  T(F): 98.4 (29 Jan 2018 05:35), Max: 99.7 (28 Jan 2018 21:29)  HR: 103 (29 Jan 2018 05:35) (103 - 115)  BP: 135/78 (29 Jan 2018 05:35) (122/68 - 135/78)  BP(mean): --  ABP: --  ABP(mean): --  RR: 18 (29 Jan 2018 05:35) (16 - 18)  SpO2: 100% (29 Jan 2018 05:35) (100% - 100%)        CAPILLARY BLOOD GLUCOSE      HOSPITAL MEDICATIONS:  MEDICATIONS  (STANDING):  benzonatate 100 milliGRAM(s) Oral three times a day  cefepime  IVPB 2000 milliGRAM(s) IV Intermittent every 8 hours  chlorhexidine 4% Liquid 1 Application(s) Topical daily  enoxaparin Injectable 40 milliGRAM(s) SubCutaneous every 24 hours  sodium chloride 0.9%. 1000 milliLiter(s) (50 mL/Hr) IV Continuous <Continuous>  vancomycin  IVPB 1000 milliGRAM(s) IV Intermittent every 12 hours    MEDICATIONS  (PRN):  acetaminophen   Tablet 650 milliGRAM(s) Oral every 6 hours PRN For Temp greater than 38.5 C (101.3 F)  acetaminophen   Tablet. 650 milliGRAM(s) Oral every 6 hours PRN Moderate Pain (4 - 6)  benzocaine 15 mG/menthol 3.6 mG Lozenge 1 Lozenge Oral every 1 hour PRN Sore Throat  guaiFENesin    Syrup 200 milliGRAM(s) Oral every 6 hours PRN Cough      LABS:                        7.8    22.25 )-----------( 304      ( 29 Jan 2018 05:30 )             23.6     01-29    128<L>  |  95<L>  |  20  ----------------------------<  130<H>  4.2   |  22  |  0.77    Ca    8.5      29 Jan 2018 05:30    TPro  6.0  /  Alb  2.0<L>  /  TBili  1.5<H>  /  DBili  x   /  AST  51<H>  /  ALT  18  /  AlkPhos  235<H>  01-29              MICROBIOLOGY:     RADIOLOGY:  [ ] Reviewed and interpreted by me    PULMONARY FUNCTION TESTS:    EKG:

## 2018-01-29 NOTE — CONSULT NOTE ADULT - PROBLEM SELECTOR PROBLEM 1
Encounter for palliative care Malignant neoplasm of right female breast, unspecified estrogen receptor status, unspecified site of breast

## 2018-01-29 NOTE — CONSULT NOTE ADULT - PROBLEM SELECTOR RECOMMENDATION 3
Discussed with patient her disease status and her end of life wishes. Patient stated she expressed her wishes to her family, which are DNR/DNI when the time comes, but is not ready to document her wishes at this time.   Discussed with patient the Health Care proxy form, patient is not ready to complete the form at this time, planning to review it with her son.  Patient lives with her son and grandchildren, which she states are her support system.

## 2018-01-29 NOTE — CONSULT NOTE ADULT - PROBLEM SELECTOR RECOMMENDATION 2
Patient actively getting treatments. Patient in no active pain at this time. If pain starts would avoid Hepatotoxic drugs and would start with Ibuprofen as needed. If Ibuprofen fails to help can consider low dose morphine.

## 2018-01-29 NOTE — CONSULT NOTE ADULT - ASSESSMENT
72F with metastatic R breast cancer to the liver with associated liver tests.  Fever without localizing symptoms.  Negative chest xray.  All cultures are negative.  At this point, she may be having tumor fever, though this is a diagnosis of exclusion.      Plan to complete 5 days IV antibiotics.  Can try indocin of this occurs again and there are no localizing s/sx of infection    above d/w NP ([id number])

## 2018-01-29 NOTE — CONSULT NOTE ADULT - PROBLEM SELECTOR RECOMMENDATION 9
-Discussed with patient disease status and het end of life wishes. Patient stated she expressed her wishes to her family.  Patient is not ready to document her wishes at this time.   -Discussed with patient the Health Care proxy form, patient is not ready to complete the form at this time, planning to review it with her son. Patient actively getting treatments. Appreciate oncology involvement.

## 2018-01-29 NOTE — PROGRESS NOTE ADULT - PROBLEM SELECTOR PLAN 2
- outpatient f/u with her oncologist  - Suspect patient's transaminitis is 2/2 to her liver mets, would monitor levels   -Pt had PET scan which showed only liver lesions per pt as OP - outpatient f/u with her oncologist  - Suspect patient's transaminitis is 2/2 to her liver mets, would monitor levels   -Pt had PET scan which showed only liver lesions per pt as OP  - palliative consult

## 2018-01-29 NOTE — PROGRESS NOTE ADULT - PROBLEM SELECTOR PLAN 1
- Pt with significant fevers, tachycardia, and leukocytosis but no identifiable source of infection.   -No fever orvernight   - Receiving vanc/cefepime , while there is no identifiable sources of infection at this time given her significant symptoms would still cover her with empiric abx with vanc/cefepime, will hold azithromycin for now  -Viral sx have improved/gone   -Procalcitonin  13.43  - Will place on prn tylenol/robitussin/lozenge prns  - Consideration that her SIRS symptoms could be 2/2 her cancer but that would not explain her cough/sore throat/nasal congestion so this is less likely as a cause of her symptoms  - VS q4h  - suspect patient's CPK elevation might be related to her abdominal pain from cough/?myalgias, pt without chest pain and EKG without acute findings other than tachycardia, would repeat levels in AM  -fungal cultures done and pending   - ESR- 105  - tessalon pearls for cough - Pt with significant fevers, tachycardia, and leukocytosis but no identifiable source of infection.   - Receiving vanc/cefepime , while there is no identifiable sources of infection at this time given her significant symptoms would still cover her with empiric abx with vanc/cefepime, will hold azithromycin for now  -Viral sx have improved/gone   -Procalcitonin  13.43  - Will place on prn tylenol/robitussin/lozenge prns  - Consideration that her SIRS symptoms could be 2/2 her cancer but that would not explain her cough/sore throat/nasal congestion so this is less likely as a cause of her symptoms  - VS q4h  - suspect patient's CPK elevation might be related to her abdominal pain from cough/?myalgias, pt without chest pain and EKG without acute findings other than tachycardia  -fungal cultures done and pending   - ESR- 105  - ID consult called

## 2018-01-29 NOTE — CONSULT NOTE ADULT - SUBJECTIVE AND OBJECTIVE BOX
HPI:  This is a 72F with history of R breast cancer s/p mastectomy and HTN who presents to the hospital with complaints of fevers, worsening cough and generalized weakness at home. Pt was recently hospitalized from 1/16-1/19 for a cough and SOB that had been ongoing for ~4 months. She was treated with levaquin with improvement in her symptoms and was discharged home. Said that she was recovering well at home until about 2 days ago when she started to develop fevers (did not measure her temperatures but said she felt very feverish). She also developed a severe cough with production of some clear sputum during this time. Said that she is coughing so much that now she gets muscle aches whenever she coughs. Said that she also started to experience generalized weakness and decreased appetite during this time. Also endorses nasal congestion and a sore throat. All of these symptoms, as per the patient, are new over the past 2 days and did not occur with her previous episode of cough/SOB. She denies any known sick contacts at home. Denies any other symptoms.     In the ED, her vitals were T 102.5 (also Tmax), P 134, /76, R 22, o2 sat 100% RA. Her lab work was significant for leukocytosis with neutrophilia, mild hyponatremia, transaminitis, elevated CK, and a lactate of 1.1. Her RVP was negative. Her EKG showed sinus tachycardia. Her CXR did not show any focal consolidations. She was given vancomycin 1g IVPB x1, cefepime 2g IVPB x1, azithromycin 500mg IVPB x1, NS 1L, and acetaminophen 1g IVPB x1. She was admitted to the pulmonary unit.     Review of patient's previous admission shows that she did not have any pulmonary consolidations on CT Chest and her BCx, UCx, RVP, and Urine Legionella were all negative. She was also found to have multiple hepatic metastases. After discharge, she had a follow up with her oncologist, had a PET scan and was due to start a new round to chemotherapy but had not start any treatments yet. (26 Jan 2018 05:38)      PERTINENT PMH REVIEWED:  [ ] YES [ ] NO           SOCIAL HISTORY:  Significant other/partner:  [ ] YES  [ ] NO            Children:  [ ] YES  [ ] NO                   Scientologist/Spirituality:  Substance hx:  [ ] YES   [ ] NO           Tobacco hx:  [ ] YES  [ ] NO             Alcohol hx: [ ] YES  [ ] NO        Home Opioid hx:  [ ] YES  [ ] NO   Living Situation: [ ] Home  [ ] Long term care  [ ] Rehab    FAMILY HISTORY:  No pertinent family history in first degree relatives    [ ] Family history non contributory     BASELINE ADLs (prior to admission):  Independent [ ] moderately [ ] fully   Dependent   [ ] moderately [ ] fully    Code Status:                      MOLST  [ ] YES [ ] NO    Living Will  [ ] YES [ ] NO    Health Care Proxy [ ] YES  [ ] NO      [ ] Surrogate  [ ] HCP  [ ] Guardian:                                                                  Phone#:    Allergies    No Known Allergies    Intolerances        MEDICATIONS  (STANDING):  benzonatate 100 milliGRAM(s) Oral three times a day  cefepime  IVPB 2000 milliGRAM(s) IV Intermittent every 8 hours  chlorhexidine 4% Liquid 1 Application(s) Topical daily  enoxaparin Injectable 40 milliGRAM(s) SubCutaneous every 24 hours  sodium chloride 0.9%. 1000 milliLiter(s) (50 mL/Hr) IV Continuous <Continuous>  vancomycin  IVPB 1000 milliGRAM(s) IV Intermittent every 12 hours    MEDICATIONS  (PRN):  acetaminophen   Tablet 650 milliGRAM(s) Oral every 6 hours PRN For Temp greater than 38.5 C (101.3 F)  acetaminophen   Tablet. 650 milliGRAM(s) Oral every 6 hours PRN Moderate Pain (4 - 6)  benzocaine 15 mG/menthol 3.6 mG Lozenge 1 Lozenge Oral every 1 hour PRN Sore Throat  guaiFENesin    Syrup 200 milliGRAM(s) Oral every 6 hours PRN Cough      PRESENT SYMPTOMS:  Source: [ ] Patient   [ ] Family   [ ] Team     Pain: [ ] YES [ ] NO  Onset:                    Location:                          Duration:                 Character:            Aggravating factors:                        Relieving factors:    Radiation:              Timing:                             Severity:      Dyspnea: [ ] YES [ ] NO - Mild [ ]  Moderate [ ]  Severe [ ]    Anxiety: [ ] YES [ ] NO  Fatigue: [ ] YES [ ] NO   Nausea: [ ] YES [ ] NO  Loss of appetite: [ ] YES [ ] NO   Constipation: [ ] YES [ ] NO     Other Symptoms:  [ ] All other review of systems negative   [ ] Unable to obtain due to poor mentation     Does patient meet criteria for Severe Protein Calorie Malnutrition?  Yes [ ]  No [ ]  PPSV 30% or below [ ]  Anasarca [ ]  Albumin < 2 [ ] Catabolic State [ ] Poor nutritional intake [ ] Significant weight loss [ ]      Palliative Performance Status Version 2:         %  ECOG -        Vital Signs Last 24 Hrs  T(C): 36.6 (29 Jan 2018 09:45), Max: 37.6 (28 Jan 2018 21:29)  T(F): 97.9 (29 Jan 2018 09:45), Max: 99.7 (28 Jan 2018 21:29)  HR: 106 (29 Jan 2018 09:45) (103 - 110)  BP: 131/76 (29 Jan 2018 09:45) (122/68 - 135/78)  BP(mean): --  RR: 18 (29 Jan 2018 09:45) (16 - 18)  SpO2: 100% (29 Jan 2018 09:45) (100% - 100%)    Physical Exam:    General: [ ] Alert,  A&O x     [ ] lethargic   [ ] Agitated   [ ] Cachexia   HEENT: [ ] Normal   [ ] Dry mouth   [ ] ET Tube    [ ] Trach   Lungs: [ ] Clear [ ] Rhonchi  [ ] Crackles [ ] Wheezing [ ] Tachypnea  [ ] Audible excessive secretions    Cardiovascular:  [ ] Regular rate and rhythm  [ ] Irregular [ ] Tachycardia   [ ] Bradycardia   Abdomen: [ ] Soft  [ ] Distended  [ ] +BS  [ ] Non tender [ ] Tender  [ ]PEG   [ ] NGT   Last BM:     Genitourinary: [ ] Normal [ ] Incontinent   [ ] Oliguria/Anuria   [ ] Huerta  Musculoskeletal:  [ ] Normal   [ ] Generalized weakness  [ ] Bedbound  [ ] Edema   Neurological: [ ] No focal deficits  [ ] Cognitive impairment     Skin: [ ] Normal   [ ] Pressure ulcers     LABS:                        7.8    22.25 )-----------( 304      ( 29 Jan 2018 05:30 )             23.6     01-29    128<L>  |  95<L>  |  20  ----------------------------<  130<H>  4.2   |  22  |  0.77    Ca    8.5      29 Jan 2018 05:30    TPro  6.0  /  Alb  2.0<L>  /  TBili  1.5<H>  /  DBili  x   /  AST  51<H>  /  ALT  18  /  AlkPhos  235<H>  01-29        I&O's Summary      RADIOLOGY & ADDITIONAL STUDIES:    Referrals:  Hospice [ ]   Chaplaincy [ ]    Child Life [ ]   Social Work [ ]   Case Management [ ]   Holistic Therapy [ ] Patient is a 72 year-old female, with history of breast cancer s/p mastectomy and HTN who presents to the hospital with complaints of fevers, worsening cough and generalized weakness at home. Patient was recently hospitalized from 1/16-1/19 for a cough and SOB, was recovering at home until 2 days ago.  She also developed a severe cough with production of some clear sputum during this time, accompanied by generalized weakness and decreased appetite during this admission. She denies any known sick contacts at home. Denies any other symptoms.  	  In the ED, her vitals were T 102.5 (also Tmax), P 134, /76, R 22, o2 sat 100% RA. Her lab work was significant for leukocytosis with neutrophilia, mild hyponatremia, transaminitis, elevated CK, and a lactate of 1.1. Her RVP was negative. Her EKG showed sinus tachycardia. Her CXR did not show any focal consolidations. She was given vancomycin 1g IVPB x1, cefepime 2g IVPB x1, azithromycin 500mg IVPB x1, NS 1L, and acetaminophen 1g IVPB x1. She was admitted to the pulmonary unit.      Review of patient's previous tests results shows that she did not have any pulmonary consolidations on CT Chest and her BCx, UCx, RVP, and Urine Legionella were all negative. She was also found to have multiple hepatic metastases. After discharge, she had a follow up with her oncologist, had a PET scan and was due to start a new round to chemotherapy but had not start any treatments yet.     PERTINENT PMH REVIEWED:  [x ] YES [ ] NO           SOCIAL HISTORY:  Significant other/partner:  [x ] YES  [ ] NO- patient leaves with her son     Children:  [x ] YES  [ ] NO                   Denominational/Spirituality:  Substance hx:  [ ] YES   [x ] NO           Tobacco hx:  [ ] YES  [ x] NO             Alcohol hx: [ ] YES  [ ] NO        Home Opioid hx:  [ ] YES  [ x] NO   Living Situation: [ ] Home  [ ] Long term care  [ ] Rehab    FAMILY HISTORY:  No pertinent family history in first degree relatives    [x ] Family history non contributory     BASELINE ADLs (prior to admission):  Independent [x ] moderately [ ] fully   Dependent   [ ] moderately [ ] fully    Code Status:                      MOLST  [ ] YES [x ] NO    Living Will  [ ] YES [x ] NO    Health Care Proxy [ ] YES  [x ] NO      Allergies- No Known Allergies  Intolerances    MEDICATIONS  (STANDING):  benzonatate 100 milliGRAM(s) Oral three times a day  cefepime  IVPB 2000 milliGRAM(s) IV Intermittent every 8 hours  chlorhexidine 4% Liquid 1 Application(s) Topical daily  enoxaparin Injectable 40 milliGRAM(s) SubCutaneous every 24 hours  sodium chloride 0.9%. 1000 milliLiter(s) (50 mL/Hr) IV Continuous <Continuous>  vancomycin  IVPB 1000 milliGRAM(s) IV Intermittent every 12 hours    MEDICATIONS  (PRN):  acetaminophen   Tablet 650 milliGRAM(s) Oral every 6 hours PRN For Temp greater than 38.5 C (101.3 F)  acetaminophen   Tablet. 650 milliGRAM(s) Oral every 6 hours PRN Moderate Pain (4 - 6)  benzocaine 15 mG/menthol 3.6 mG Lozenge 1 Lozenge Oral every 1 hour PRN Sore Throat  guaiFENesin    Syrup 200 milliGRAM(s) Oral every 6 hours PRN Cough    PRESENT SYMPTOMS:  Source: [ x Patient   [ ] Family   [ ] Team     Pain: [ ] YES [x] NO    Dyspnea: [ ] YES [x] NO - Mild [ ]  Moderate [ ]  Severe [ ]    Anxiety: [ ] YES [x] NO  Fatigue: [ ] YES [x] NO   Nausea: [ ] YES [x] NO  Loss of appetite: [ ] YES [x] NO   Constipation: [ ] YES [x] NO     Other Symptoms:  [x] All other review of systems negative   [ ] Unable to obtain due to poor mentation     Does patient meet criteria for Severe Protein Calorie Malnutrition?  Yes [ ]  No [ x  PPSV 30% or below [ ]  Anasarca [ ]  Albumin < 2 [ ] Catabolic State [ ] Poor nutritional intake [ ] Significant weight loss [ ]      ECOG - 1    Vital Signs Last 24 Hrs  T(C): 36.6 (29 Jan 2018 09:45), Max: 37.6 (28 Jan 2018 21:29)  T(F): 97.9 (29 Jan 2018 09:45), Max: 99.7 (28 Jan 2018 21:29)  HR: 106 (29 Jan 2018 09:45) (103 - 110)  BP: 131/76 (29 Jan 2018 09:45) (122/68 - 135/78)  BP(mean): --  RR: 18 (29 Jan 2018 09:45) (16 - 18)  SpO2: 100% (29 Jan 2018 09:45) (100% - 100%)    Physical Exam:    General: [ ] Alert,  A&O x     [ ] lethargic   [ ] Agitated   [ ] Cachexia   HEENT: [ ] Normal   [ ] Dry mouth   [ ] ET Tube    [ ] Trach   Lungs: [ ] Clear [ ] Rhonchi  [ ] Crackles [ ] Wheezing [ ] Tachypnea  [ ] Audible excessive secretions    Cardiovascular:  [ ] Regular rate and rhythm  [ ] Irregular [ ] Tachycardia   [ ] Bradycardia   Abdomen: [ ] Soft  [ ] Distended  [ ] +BS  [ ] Non tender [ ] Tender  [ ]PEG   [ ] NGT   Last BM:     Genitourinary: [ ] Normal [ ] Incontinent   [ ] Oliguria/Anuria   [ ] Huerta  Musculoskeletal:  [ ] Normal   [ ] Generalized weakness  [ ] Bedbound  [ ] Edema   Neurological: [ ] No focal deficits  [ ] Cognitive impairment     Skin: [ ] Normal   [ ] Pressure ulcers     LABS:                        7.8    22.25 )-----------( 304      ( 29 Jan 2018 05:30 )             23.6     01-29    128<L>  |  95<L>  |  20  ----------------------------<  130<H>  4.2   |  22  |  0.77    Ca    8.5      29 Jan 2018 05:30    TPro  6.0  /  Alb  2.0<L>  /  TBili  1.5<H>  /  DBili  x   /  AST  51<H>  /  ALT  18  /  AlkPhos  235<H>  01-29        I&O's Summary      RADIOLOGY & ADDITIONAL STUDIES:    Referrals:  Hospice [ ]   Chaplaincy [ ]    Child Life [ ]   Social Work [ ]   Case Management [ ]   Holistic Therapy [ ] HPI:  72 year-old woman with history of breast cancer s/p mastectomy and HTN who presents to the hospital with complaints of fevers, worsening cough and generalized weakness at home. Patient was recently hospitalized from 1/16-1/19 for a cough and SOB, was recovering at home until 2 days ago prior to admission.  She also developed a severe cough with production of some clear sputum during this time, accompanied by generalized weakness and decreased appetite during this admission. She denies any known sick contacts at home. Denies any other symptoms. During her time in the ED she had a a fever of 102.5 F. After review of patient's previous tests results shows that she did not have any pulmonary consolidations on CT Chest and her BCx, UCx, RVP, and Urine Legionella were all negative. She was also found to have multiple hepatic metastases. After discharge, she had a follow up with her oncologist, had a PET scan and was due to start a new round to chemotherapy but had not start any treatments yet. Patient was examined in no acute distress and appeared comfortable.     PERTINENT PMH REVIEWED:  [x ] YES [ ] NO           SOCIAL HISTORY:  Significant other/partner:  [x ] YES  [ ] NO- patient leaves with her son     Children:  [x ] YES  [ ] NO             Jain/Spirituality: Anabaptist  Substance hx:  [ ] YES   [x ] NO           Tobacco hx:  [ ] YES  [ x] NO             Alcohol hx: [ ] YES  [ ] NO        Home Opioid hx:  [ ] YES  [ x] NO   Living Situation: [x ] Home  [ ] Long term care  [ ] Rehab    FAMILY HISTORY:  No pertinent family history in first degree relatives    [x ] Family history non contributory     BASELINE ADLs (prior to admission):  Independent [x ] moderately [ ] fully   Dependent   [ ] moderately [ ] fully    Code Status:                      MOLST  [ ] YES [x ] NO    Living Will  [ ] YES [x ] NO    Health Care Proxy [ ] YES  [x ] NO      Allergies- No Known Allergies  Intolerances    MEDICATIONS  (STANDING):  benzonatate 100 milliGRAM(s) Oral three times a day  cefepime  IVPB 2000 milliGRAM(s) IV Intermittent every 8 hours  chlorhexidine 4% Liquid 1 Application(s) Topical daily  enoxaparin Injectable 40 milliGRAM(s) SubCutaneous every 24 hours  sodium chloride 0.9%. 1000 milliLiter(s) (50 mL/Hr) IV Continuous <Continuous>  vancomycin  IVPB 1000 milliGRAM(s) IV Intermittent every 12 hours    MEDICATIONS  (PRN):  acetaminophen   Tablet 650 milliGRAM(s) Oral every 6 hours PRN For Temp greater than 38.5 C (101.3 F)  acetaminophen   Tablet. 650 milliGRAM(s) Oral every 6 hours PRN Moderate Pain (4 - 6)  benzocaine 15 mG/menthol 3.6 mG Lozenge 1 Lozenge Oral every 1 hour PRN Sore Throat  guaiFENesin    Syrup 200 milliGRAM(s) Oral every 6 hours PRN Cough    PRESENT SYMPTOMS:  Source: [ x Patient   [ ] Family   [ ] Team     Pain: [ ] YES [x] NO    Dyspnea: [ ] YES [x] NO - Mild [ ]  Moderate [ ]  Severe [ ]    Anxiety: [ ] YES [x] NO  Fatigue: [ ] YES [x] NO   Nausea: [ ] YES [x] NO  Loss of appetite: [ ] YES [x] NO   Constipation: [ ] YES [x] NO     Other Symptoms:  [x] All other review of systems negative   [ ] Unable to obtain due to poor mentation     Does patient meet criteria for Severe Protein Calorie Malnutrition?  Yes [ ]  No [ x]  PPSV 30% or below [ ]  Anasarca [ ]  Albumin < 2 [ ] Catabolic State [ ] Poor nutritional intake [ ] Significant weight loss [ ]      ECOG - 1    Vital Signs Last 24 Hrs  T(C): 36.6 (29 Jan 2018 09:45), Max: 37.6 (28 Jan 2018 21:29)  T(F): 97.9 (29 Jan 2018 09:45), Max: 99.7 (28 Jan 2018 21:29)  HR: 106 (29 Jan 2018 09:45) (103 - 110)  BP: 131/76 (29 Jan 2018 09:45) (122/68 - 135/78)  BP(mean): --  RR: 18 (29 Jan 2018 09:45) (16 - 18)  SpO2: 100% (29 Jan 2018 09:45) (100% - 100%)    Physical Exam:    General: [ x] Alert,  A&O x 3    [ ] lethargic   [ ] Agitated   [ ] Cachexia   HEENT: [x ] Normal   [ ] Dry mouth   [ ] ET Tube    [ ] Trach   Lungs: [x ] Clear [ ] Rhonchi  [ ] Crackles [ ] Wheezing [ ] Tachypnea  [ ] Audible excessive secretions    Cardiovascular:  [ x] Regular rate and rhythm  [ ] Irregular [ ] Tachycardia   [ ] Bradycardia   Abdomen: [x ] Soft  [ ] Distended  [ ] +BS  [x ] Non tender [ ] Tender  [ ]PEG   [ ] NGT   Last BM:   1/29/18  Genitourinary: [x ] Normal [ ] Incontinent   [ ] Oliguria/Anuria   [ ] Huerta  Musculoskeletal:  [ x] Normal   [ ] Generalized weakness  [ ] Bedbound  [ ] Edema   Neurological: [x ] No focal deficits  [ ] Cognitive impairment     Skin: [x ] Normal   [ ] Pressure ulcers     LABS:                        7.8    22.25 )-----------( 304      ( 29 Jan 2018 05:30 )             23.6     01-29    128<L>  |  95<L>  |  20  ----------------------------<  130<H>  4.2   |  22  |  0.77    Ca    8.5      29 Jan 2018 05:30    TPro  6.0  /  Alb  2.0<L>  /  TBili  1.5<H>  /  DBili  x   /  AST  51<H>  /  ALT  18  /  AlkPhos  235<H>  01-29        I&O's Summary      RADIOLOGY & ADDITIONAL STUDIES:    EXAM:  CT ABDOMEN AND PELVIS IC    PROCEDURE DATE:  Jan 18 2018   IMPRESSION:   Extensive hepatic metastatic disease with involvement of the right   adrenal gland.  Trace right pleural effusion.      EXAM:  XR CHEST PORTABLE URGENT 1V    PROCEDURE DATE:  Jan 26 2018     IMPRESSION:   Low lung volumes. No focal consolidations.       Referrals:  Hospice [ ]   Chaplaincy [ ]    Child Life [ ]   Social Work [ ]   Case Management [ ]   Holistic Therapy [ ]

## 2018-01-29 NOTE — CONSULT NOTE ADULT - ASSESSMENT
Patient is a 72 year-old female, with history of breast cancer s/p mastectomy consulting for symptom management and goals of care conversations. 72 year-old woman breast cancer s/p mastectomy, palliative being consulting for symptom management and goals of care conversations.

## 2018-01-30 LAB
ALBUMIN SERPL ELPH-MCNC: 2.3 G/DL — LOW (ref 3.3–5)
ALP SERPL-CCNC: 272 U/L — HIGH (ref 40–120)
ALT FLD-CCNC: 19 U/L — SIGNIFICANT CHANGE UP (ref 4–33)
AST SERPL-CCNC: 51 U/L — HIGH (ref 4–32)
BACTERIA BLD CULT: SIGNIFICANT CHANGE UP
BACTERIA BLD CULT: SIGNIFICANT CHANGE UP
BASOPHILS # BLD AUTO: 0.09 K/UL — SIGNIFICANT CHANGE UP (ref 0–0.2)
BASOPHILS NFR BLD AUTO: 0.5 % — SIGNIFICANT CHANGE UP (ref 0–2)
BILIRUB SERPL-MCNC: 1.4 MG/DL — HIGH (ref 0.2–1.2)
BUN SERPL-MCNC: 18 MG/DL — SIGNIFICANT CHANGE UP (ref 7–23)
CALCIUM SERPL-MCNC: 8.8 MG/DL — SIGNIFICANT CHANGE UP (ref 8.4–10.5)
CHLORIDE SERPL-SCNC: 100 MMOL/L — SIGNIFICANT CHANGE UP (ref 98–107)
CK SERPL-CCNC: 223 U/L — HIGH (ref 25–170)
CO2 SERPL-SCNC: 25 MMOL/L — SIGNIFICANT CHANGE UP (ref 22–31)
CREAT SERPL-MCNC: 0.84 MG/DL — SIGNIFICANT CHANGE UP (ref 0.5–1.3)
EOSINOPHIL # BLD AUTO: 0.15 K/UL — SIGNIFICANT CHANGE UP (ref 0–0.5)
EOSINOPHIL NFR BLD AUTO: 0.9 % — SIGNIFICANT CHANGE UP (ref 0–6)
GLUCOSE SERPL-MCNC: 86 MG/DL — SIGNIFICANT CHANGE UP (ref 70–99)
HCT VFR BLD CALC: 24.8 % — LOW (ref 34.5–45)
HGB BLD-MCNC: 8.3 G/DL — LOW (ref 11.5–15.5)
IMM GRANULOCYTES # BLD AUTO: 0.29 # — SIGNIFICANT CHANGE UP
IMM GRANULOCYTES NFR BLD AUTO: 1.7 % — HIGH (ref 0–1.5)
LYMPHOCYTES # BLD AUTO: 12.9 % — LOW (ref 13–44)
LYMPHOCYTES # BLD AUTO: 2.22 K/UL — SIGNIFICANT CHANGE UP (ref 1–3.3)
MAGNESIUM SERPL-MCNC: 2.1 MG/DL — SIGNIFICANT CHANGE UP (ref 1.6–2.6)
MCHC RBC-ENTMCNC: 27.5 PG — SIGNIFICANT CHANGE UP (ref 27–34)
MCHC RBC-ENTMCNC: 33.5 % — SIGNIFICANT CHANGE UP (ref 32–36)
MCV RBC AUTO: 82.1 FL — SIGNIFICANT CHANGE UP (ref 80–100)
MONOCYTES # BLD AUTO: 1.93 K/UL — HIGH (ref 0–0.9)
MONOCYTES NFR BLD AUTO: 11.2 % — SIGNIFICANT CHANGE UP (ref 2–14)
NEUTROPHILS # BLD AUTO: 12.49 K/UL — HIGH (ref 1.8–7.4)
NEUTROPHILS NFR BLD AUTO: 72.8 % — SIGNIFICANT CHANGE UP (ref 43–77)
NRBC # FLD: 0 — SIGNIFICANT CHANGE UP
PHOSPHATE SERPL-MCNC: 2.7 MG/DL — SIGNIFICANT CHANGE UP (ref 2.5–4.5)
PLATELET # BLD AUTO: 332 K/UL — SIGNIFICANT CHANGE UP (ref 150–400)
PMV BLD: 10.1 FL — SIGNIFICANT CHANGE UP (ref 7–13)
POTASSIUM SERPL-MCNC: 4.5 MMOL/L — SIGNIFICANT CHANGE UP (ref 3.5–5.3)
POTASSIUM SERPL-SCNC: 4.5 MMOL/L — SIGNIFICANT CHANGE UP (ref 3.5–5.3)
PROT SERPL-MCNC: 6.6 G/DL — SIGNIFICANT CHANGE UP (ref 6–8.3)
RBC # BLD: 3.02 M/UL — LOW (ref 3.8–5.2)
RBC # FLD: 18.3 % — HIGH (ref 10.3–14.5)
SODIUM SERPL-SCNC: 134 MMOL/L — LOW (ref 135–145)
WBC # BLD: 17.17 K/UL — HIGH (ref 3.8–10.5)
WBC # FLD AUTO: 17.17 K/UL — HIGH (ref 3.8–10.5)

## 2018-01-30 PROCEDURE — 99232 SBSQ HOSP IP/OBS MODERATE 35: CPT

## 2018-01-30 PROCEDURE — 93306 TTE W/DOPPLER COMPLETE: CPT | Mod: 26

## 2018-01-30 PROCEDURE — 99232 SBSQ HOSP IP/OBS MODERATE 35: CPT | Mod: GC

## 2018-01-30 RX ADMIN — CEFEPIME 100 MILLIGRAM(S): 1 INJECTION, POWDER, FOR SOLUTION INTRAMUSCULAR; INTRAVENOUS at 18:18

## 2018-01-30 RX ADMIN — CEFEPIME 100 MILLIGRAM(S): 1 INJECTION, POWDER, FOR SOLUTION INTRAMUSCULAR; INTRAVENOUS at 01:38

## 2018-01-30 RX ADMIN — Medication 250 MILLIGRAM(S): at 02:10

## 2018-01-30 RX ADMIN — Medication 100 MILLIGRAM(S): at 14:03

## 2018-01-30 RX ADMIN — Medication 100 MILLIGRAM(S): at 05:22

## 2018-01-30 RX ADMIN — Medication 250 MILLIGRAM(S): at 14:03

## 2018-01-30 RX ADMIN — CEFEPIME 100 MILLIGRAM(S): 1 INJECTION, POWDER, FOR SOLUTION INTRAMUSCULAR; INTRAVENOUS at 10:01

## 2018-01-30 RX ADMIN — BENZOCAINE AND MENTHOL 1 LOZENGE: 5; 1 LIQUID ORAL at 20:05

## 2018-01-30 RX ADMIN — Medication 100 MILLIGRAM(S): at 21:06

## 2018-01-30 RX ADMIN — ENOXAPARIN SODIUM 40 MILLIGRAM(S): 100 INJECTION SUBCUTANEOUS at 14:03

## 2018-01-30 RX ADMIN — CHLORHEXIDINE GLUCONATE 1 APPLICATION(S): 213 SOLUTION TOPICAL at 14:03

## 2018-01-30 NOTE — PROGRESS NOTE ADULT - PROBLEM SELECTOR PLAN 1
- Pt with significant fevers, tachycardia, and leukocytosis but no identifiable source of infection.   - Receiving vanc/cefepime , while there is no identifiable sources of infection at this time given her significant symptoms would still cover her with empiric abx with vanc/cefepime, will hold azithromycin for now  -Viral sx have improved/gone   -Procalcitonin  13.43  - Will place on prn tylenol/robitussin/lozenge prns  - Consideration that her SIRS symptoms could be 2/2 her cancer but that would not explain her cough/sore throat/nasal congestion so this is less likely as a cause of her symptoms  - VS q4h  - suspect patient's CPK elevation might be related to her abdominal pain from cough/?myalgias, pt without chest pain and EKG without acute findings other than tachycardia  -fungal cultures done and pending   - ESR- 105  - ID consult called  - Pt will finish 5 day course of abx and d.c home

## 2018-01-30 NOTE — PROGRESS NOTE ADULT - PROBLEM SELECTOR PLAN 2
- outpatient f/u with her oncologist  - Suspect patient's transaminitis is 2/2 to her liver mets, would monitor levels   -Pt had PET scan which showed only liver lesions per pt as OP  - palliative consult  - Oncology paperwork faxed over from Dr. Montemayor office

## 2018-01-30 NOTE — PROGRESS NOTE ADULT - SUBJECTIVE AND OBJECTIVE BOX
Patient is a 72y old  Female who presents with a chief complaint of Fevers, cough, generalized weakness (26 Jan 2018 05:38)    f/u fever    Allergies  No Known Allergies    ANTIMICROBIALS:    cefepime  IVPB 2000 every 8 hours (1/26-)  vancomycin  IVPB 1000 every 12 hours (1/26-)    MEDICATIONS  (STANDING):  acetaminophen   Tablet 650 every 6 hours PRN  acetaminophen   Tablet. 650 every 6 hours PRN  benzonatate 100 three times a day  enoxaparin Injectable 40 every 24 hours  guaiFENesin    Syrup 200 every 6 hours PRN    Vital Signs Last 24 Hrs  T(F): 98.2 (01-30-18 @ 05:21), Max: 98.7 (01-29-18 @ 13:14)  HR: 93 (01-30-18 @ 05:21)  BP: 137/59 (01-30-18 @ 05:21)  RR: 18 (01-30-18 @ 05:21)  SpO2: 100% (01-30-18 @ 05:21) (100% - 100%)  Wt(kg): --    PHYSICAL EXAM:  General: non-toxic  HEAD/EYES: anicteric  ENT:  supple  Cardiovascular:   S1, S2; R breast implant - no signs of infection  Respiratory:  clear bilaterally  GI:  soft, non-tender, normal bowel sounds  :  no CVA tenderness  Musculoskeletal:  no synovitis  Neurologic:  grossly non-focal  Skin:  no rash  Lymph: no lymphadenopathy  Psychiatric:  appropriate affect  Vascular:  no phlebitis    WBC Count: 17.17 (01-30-18 @ 05:26)  WBC Count: 22.25 (01-29-18 @ 05:30)  WBC Count: 27.83 (01-28-18 @ 07:25)  WBC Count: 25.50 (01-27-18 @ 05:30)  WBC Count: 28.85 (01-26-18 @ 11:15)  WBC Count: 24.16 (01-25-18 @ 21:15)                     8.3    17.17 )-----------( 332      ( 30 Jan 2018 05:26 )             24.8 01-30    134  |  100  |  18  ----------------------------<  86  4.5   |  25  |  0.84  Ca    8.8      30 Jan 2018 05:26Phos  2.7     01-30Mg     2.1     01-30  TPro  6.6  /  Alb  2.3  /  TBili  1.4  /  DBili  x   /  AST  51  /  ALT  19  /  AlkPhos  272  01-30    MICROBIOLOGY:  Culture - Fungal, Blood (collected 01-27-18 @ 16:05)  Source: BLOOD PERIPHERAL  Preliminary Report (01-28-18 @ 16:04):    CULTURE NEGATIVE FOR YEASTS AND MOLDS-PRELIMINARY RESULT    AFTER 24 HOURS INCUBATION    Culture - Urine (collected 01-27-18 @ 04:41)  Source: URINE MIDSTREAM  Final Report (01-28-18 @ 08:07):    NO GROWTH AT 24 HOURS    Culture - Blood (collected 01-26-18 @ 21:30)  Source: BLOOD VENOUS  Preliminary Report (01-28-18 @ 21:29):    NO ORGANISMS ISOLATED    NO ORGANISMS ISOLATED AT 48 HRS.    Culture - Blood (collected 01-26-18 @ 20:44)  Source: BLOOD PERIPHERAL  Preliminary Report (01-28-18 @ 20:43):    NO ORGANISMS ISOLATED    NO ORGANISMS ISOLATED AT 48 HRS.    Vancomycin Level, Trough: 18.8 ug/mL (01-28-18 @ 14:33)    RADIOLOGY:  Xray Chest 1 View AP- PORTABLE-Urgent (01.26.18 @ 01:01)   FINDINGS:  The heart size is normal.   Low lung volumes. No focal consolidations. There are no pleural effusions or pneumothorax.  Degenerative changes of the thoracic spine.  Calcified right breast implant.  IMPRESSION:   Low lung volumes. No focal consolidations.     VA Duplex Ext Veins Lower Comp, Bilat. (01.18.18 @ 12:40)  Summary/Impressions:  1.  No evidence of deep venous thrombosis in the right and left lower extremities.  2.  No evidence of deep and superficial venous insufficiency noted in the right and left lower extremities.

## 2018-01-30 NOTE — PROGRESS NOTE ADULT - PROBLEM SELECTOR PLAN 1
- Pt with significant fevers, tachycardia, and leukocytosis but no identifiable source of infection.   - Receiving vanc/cefepime , while there is no identifiable sources of infection at this time given her significant symptoms would still cover her with empiric abx with vanc/cefepime, will hold azithromycin for now  -Viral sx have improved/gone   -Procalcitonin  13.43  - Will place on prn tylenol/robitussin/lozenge prns  - Consideration that her SIRS symptoms could be 2/2 her cancer but that would not explain her cough/sore throat/nasal congestion so this is less likely as a cause of her symptoms  - VS q4h  - suspect patient's CPK elevation might be related to her abdominal pain from cough/?myalgias, pt without chest pain and EKG without acute findings other than tachycardia  -fungal cultures done and pending   - ESR- 105  - ID consult called  - No source found will tx for 5 day of iv abx and d/chome with f.u oncology

## 2018-01-30 NOTE — PROGRESS NOTE ADULT - PROBLEM SELECTOR PLAN 2
- outpatient f/u with her oncologist  - Suspect patient's transaminitis is 2/2 to her liver mets, would monitor levels   -Pt had PET scan which showed only liver lesions per pt as OP  - oncology records faxed over and in chart   - palliative consult

## 2018-01-30 NOTE — PROGRESS NOTE ADULT - SUBJECTIVE AND OBJECTIVE BOX
CHIEF COMPLAINT:    Interval Events:    REVIEW OF SYSTEMS:  Constitutional:   Eyes:  ENT:  CV:  Resp:  GI:  :  MSK:  Integumentary:  Neurological:  Psychiatric:  Endocrine:  Hematologic/Lymphatic:  Allergic/Immunologic:  [ ] All other systems negative  [ ] Unable to assess ROS because ________    OBJECTIVE:  ICU Vital Signs Last 24 Hrs  T(C): 36.8 (30 Jan 2018 14:03), Max: 36.9 (29 Jan 2018 18:29)  T(F): 98.3 (30 Jan 2018 14:03), Max: 98.5 (29 Jan 2018 18:29)  HR: 92 (30 Jan 2018 14:03) (92 - 103)  BP: 124/67 (30 Jan 2018 14:03) (124/67 - 141/78)  BP(mean): --  ABP: --  ABP(mean): --  RR: 18 (30 Jan 2018 14:03) (18 - 18)  SpO2: 100% (30 Jan 2018 14:03) (100% - 100%)        CAPILLARY BLOOD GLUCOSE          PHYSICAL EXAM:  General:   HEENT:   Lymph Nodes:  Neck:   Respiratory:   Cardiovascular:   Abdomen:   Extremities:   Skin:   Neurological:  Psychiatry:    HOSPITAL MEDICATIONS:  MEDICATIONS  (STANDING):  benzonatate 100 milliGRAM(s) Oral three times a day  cefepime  IVPB 2000 milliGRAM(s) IV Intermittent every 8 hours  chlorhexidine 4% Liquid 1 Application(s) Topical daily  enoxaparin Injectable 40 milliGRAM(s) SubCutaneous every 24 hours  sodium chloride 0.9%. 1000 milliLiter(s) (50 mL/Hr) IV Continuous <Continuous>  vancomycin  IVPB 1000 milliGRAM(s) IV Intermittent every 12 hours    MEDICATIONS  (PRN):  acetaminophen   Tablet 650 milliGRAM(s) Oral every 6 hours PRN For Temp greater than 38.5 C (101.3 F)  acetaminophen   Tablet. 650 milliGRAM(s) Oral every 6 hours PRN Moderate Pain (4 - 6)  benzocaine 15 mG/menthol 3.6 mG Lozenge 1 Lozenge Oral every 1 hour PRN Sore Throat  guaiFENesin    Syrup 200 milliGRAM(s) Oral every 6 hours PRN Cough      LABS:                        8.3    17.17 )-----------( 332      ( 30 Jan 2018 05:26 )             24.8     01-30    134<L>  |  100  |  18  ----------------------------<  86  4.5   |  25  |  0.84    Ca    8.8      30 Jan 2018 05:26  Phos  2.7     01-30  Mg     2.1     01-30    TPro  6.6  /  Alb  2.3<L>  /  TBili  1.4<H>  /  DBili  x   /  AST  51<H>  /  ALT  19  /  AlkPhos  272<H>  01-30              MICROBIOLOGY:     RADIOLOGY:  [ ] Reviewed and interpreted by me    PULMONARY FUNCTION TESTS:    EKG:

## 2018-01-30 NOTE — PROGRESS NOTE ADULT - SUBJECTIVE AND OBJECTIVE BOX
CHIEF COMPLAINT:    Interval Events:    REVIEW OF SYSTEMS:  Constitutional:   Eyes:  ENT:  CV:  Resp:  GI:  :  MSK:  Integumentary:  Neurological:  Psychiatric:  Endocrine:  Hematologic/Lymphatic:  Allergic/Immunologic:  [ ] All other systems negative  [ ] Unable to assess ROS because ________    OBJECTIVE:  ICU Vital Signs Last 24 Hrs  T(C): 36.8 (30 Jan 2018 14:03), Max: 36.9 (29 Jan 2018 21:14)  T(F): 98.3 (30 Jan 2018 14:03), Max: 98.5 (29 Jan 2018 21:14)  HR: 92 (30 Jan 2018 14:03) (92 - 99)  BP: 124/67 (30 Jan 2018 14:03) (124/67 - 139/87)  BP(mean): --  ABP: --  ABP(mean): --  RR: 18 (30 Jan 2018 14:03) (18 - 18)  SpO2: 100% (30 Jan 2018 14:03) (100% - 100%)        CAPILLARY BLOOD GLUCOSE          PHYSICAL EXAM:  General:   HEENT:   Lymph Nodes:  Neck:   Respiratory:   Cardiovascular:   Abdomen:   Extremities:   Skin:   Neurological:  Psychiatry:    HOSPITAL MEDICATIONS:  MEDICATIONS  (STANDING):  benzonatate 100 milliGRAM(s) Oral three times a day  cefepime  IVPB 2000 milliGRAM(s) IV Intermittent every 8 hours  chlorhexidine 4% Liquid 1 Application(s) Topical daily  enoxaparin Injectable 40 milliGRAM(s) SubCutaneous every 24 hours  sodium chloride 0.9%. 1000 milliLiter(s) (50 mL/Hr) IV Continuous <Continuous>  vancomycin  IVPB 1000 milliGRAM(s) IV Intermittent every 12 hours    MEDICATIONS  (PRN):  acetaminophen   Tablet 650 milliGRAM(s) Oral every 6 hours PRN For Temp greater than 38.5 C (101.3 F)  acetaminophen   Tablet. 650 milliGRAM(s) Oral every 6 hours PRN Moderate Pain (4 - 6)  benzocaine 15 mG/menthol 3.6 mG Lozenge 1 Lozenge Oral every 1 hour PRN Sore Throat  guaiFENesin    Syrup 200 milliGRAM(s) Oral every 6 hours PRN Cough      LABS:                        8.3    17.17 )-----------( 332      ( 30 Jan 2018 05:26 )             24.8     01-30    134<L>  |  100  |  18  ----------------------------<  86  4.5   |  25  |  0.84    Ca    8.8      30 Jan 2018 05:26  Phos  2.7     01-30  Mg     2.1     01-30    TPro  6.6  /  Alb  2.3<L>  /  TBili  1.4<H>  /  DBili  x   /  AST  51<H>  /  ALT  19  /  AlkPhos  272<H>  01-30              MICROBIOLOGY:     RADIOLOGY:  [ ] Reviewed and interpreted by me    PULMONARY FUNCTION TESTS:    EKG:

## 2018-01-31 ENCOUNTER — TRANSCRIPTION ENCOUNTER (OUTPATIENT)
Age: 73
End: 2018-01-31

## 2018-01-31 VITALS
RESPIRATION RATE: 16 BRPM | SYSTOLIC BLOOD PRESSURE: 139 MMHG | HEART RATE: 90 BPM | OXYGEN SATURATION: 100 % | DIASTOLIC BLOOD PRESSURE: 78 MMHG | TEMPERATURE: 98 F

## 2018-01-31 LAB
ALBUMIN SERPL ELPH-MCNC: 2.4 G/DL — LOW (ref 3.3–5)
ALP SERPL-CCNC: 304 U/L — HIGH (ref 40–120)
ALT FLD-CCNC: 16 U/L — SIGNIFICANT CHANGE UP (ref 4–33)
AST SERPL-CCNC: 52 U/L — HIGH (ref 4–32)
BACTERIA BLD CULT: SIGNIFICANT CHANGE UP
BACTERIA BLD CULT: SIGNIFICANT CHANGE UP
BASOPHILS # BLD AUTO: 0.14 K/UL — SIGNIFICANT CHANGE UP (ref 0–0.2)
BASOPHILS NFR BLD AUTO: 1.1 % — SIGNIFICANT CHANGE UP (ref 0–2)
BILIRUB SERPL-MCNC: 1.4 MG/DL — HIGH (ref 0.2–1.2)
BUN SERPL-MCNC: 15 MG/DL — SIGNIFICANT CHANGE UP (ref 7–23)
CALCIUM SERPL-MCNC: 9.3 MG/DL — SIGNIFICANT CHANGE UP (ref 8.4–10.5)
CHLORIDE SERPL-SCNC: 100 MMOL/L — SIGNIFICANT CHANGE UP (ref 98–107)
CO2 SERPL-SCNC: 24 MMOL/L — SIGNIFICANT CHANGE UP (ref 22–31)
CREAT SERPL-MCNC: 0.82 MG/DL — SIGNIFICANT CHANGE UP (ref 0.5–1.3)
EOSINOPHIL # BLD AUTO: 0.12 K/UL — SIGNIFICANT CHANGE UP (ref 0–0.5)
EOSINOPHIL NFR BLD AUTO: 0.9 % — SIGNIFICANT CHANGE UP (ref 0–6)
GLUCOSE SERPL-MCNC: 78 MG/DL — SIGNIFICANT CHANGE UP (ref 70–99)
HCT VFR BLD CALC: 26.6 % — LOW (ref 34.5–45)
HGB BLD-MCNC: 8.8 G/DL — LOW (ref 11.5–15.5)
IMM GRANULOCYTES # BLD AUTO: 0.36 # — SIGNIFICANT CHANGE UP
IMM GRANULOCYTES NFR BLD AUTO: 2.7 % — HIGH (ref 0–1.5)
LYMPHOCYTES # BLD AUTO: 15.8 % — SIGNIFICANT CHANGE UP (ref 13–44)
LYMPHOCYTES # BLD AUTO: 2.1 K/UL — SIGNIFICANT CHANGE UP (ref 1–3.3)
MCHC RBC-ENTMCNC: 27.2 PG — SIGNIFICANT CHANGE UP (ref 27–34)
MCHC RBC-ENTMCNC: 33.1 % — SIGNIFICANT CHANGE UP (ref 32–36)
MCV RBC AUTO: 82.1 FL — SIGNIFICANT CHANGE UP (ref 80–100)
MONOCYTES # BLD AUTO: 1.75 K/UL — HIGH (ref 0–0.9)
MONOCYTES NFR BLD AUTO: 13.1 % — SIGNIFICANT CHANGE UP (ref 2–14)
NEUTROPHILS # BLD AUTO: 8.86 K/UL — HIGH (ref 1.8–7.4)
NEUTROPHILS NFR BLD AUTO: 66.4 % — SIGNIFICANT CHANGE UP (ref 43–77)
NRBC # FLD: 0 — SIGNIFICANT CHANGE UP
PLATELET # BLD AUTO: 398 K/UL — SIGNIFICANT CHANGE UP (ref 150–400)
PMV BLD: 9.3 FL — SIGNIFICANT CHANGE UP (ref 7–13)
POTASSIUM SERPL-MCNC: 4.2 MMOL/L — SIGNIFICANT CHANGE UP (ref 3.5–5.3)
POTASSIUM SERPL-SCNC: 4.2 MMOL/L — SIGNIFICANT CHANGE UP (ref 3.5–5.3)
PROT SERPL-MCNC: 7.1 G/DL — SIGNIFICANT CHANGE UP (ref 6–8.3)
RBC # BLD: 3.24 M/UL — LOW (ref 3.8–5.2)
RBC # FLD: 18.3 % — HIGH (ref 10.3–14.5)
SODIUM SERPL-SCNC: 134 MMOL/L — LOW (ref 135–145)
WBC # BLD: 13.33 K/UL — HIGH (ref 3.8–10.5)
WBC # FLD AUTO: 13.33 K/UL — HIGH (ref 3.8–10.5)

## 2018-01-31 PROCEDURE — 99238 HOSP IP/OBS DSCHRG MGMT 30/<: CPT

## 2018-01-31 RX ORDER — CEFEPIME 1 G/1
2000 INJECTION, POWDER, FOR SOLUTION INTRAMUSCULAR; INTRAVENOUS EVERY 8 HOURS
Qty: 0 | Refills: 0 | Status: DISCONTINUED | OUTPATIENT
Start: 2018-01-31 | End: 2018-01-31

## 2018-01-31 RX ADMIN — CEFEPIME 100 MILLIGRAM(S): 1 INJECTION, POWDER, FOR SOLUTION INTRAMUSCULAR; INTRAVENOUS at 01:00

## 2018-01-31 RX ADMIN — CEFEPIME 100 MILLIGRAM(S): 1 INJECTION, POWDER, FOR SOLUTION INTRAMUSCULAR; INTRAVENOUS at 09:26

## 2018-01-31 RX ADMIN — CHLORHEXIDINE GLUCONATE 1 APPLICATION(S): 213 SOLUTION TOPICAL at 09:30

## 2018-01-31 RX ADMIN — Medication 250 MILLIGRAM(S): at 01:38

## 2018-01-31 RX ADMIN — Medication 250 MILLIGRAM(S): at 14:09

## 2018-01-31 RX ADMIN — Medication 200 MILLIGRAM(S): at 14:13

## 2018-01-31 NOTE — DISCHARGE NOTE ADULT - HOSPITAL COURSE
This is a 72F with history of R breast cancer with mets to liver and HTN who presents to the hospital with complaints of fevers, worsening cough and generalized weakness. Found to have SIRS with unclear infectious source.      SIRS (systemic inflammatory response syndrome).    - Pt with significant fevers,>102,  tachycardia, and leukocytosis but no identifiable source of infection  -Her RVP is negative but given her constellation of symptoms (rhinorrhea, sore throat, cough) suspect she might still have a viral syndrome, unclear if a procalcitonin would be of benefit at this time, would defer decision to primary team  - Will place on prn tylenol/robitussin/lozenge prnsved vanc/cefepime/azithromycin in ED, while there is no identifiable sources of infection at this time given her significant symptoms would still cover her with empiric abx with vanc/cefepime   -cxr was negative  blood cultures were negative as well as negative fungal cultures   Pt was seen by ID when procalcitonin was sent and 13.  It was decided to give 5 day course of antibiotics. Pt feeling better , no further fevers or URI symptoms.   BP monitored and stable off medications   Pt to follow up with Sim Melgoza for futher plan of treatment     Dispo: home with follow up with Oncologist

## 2018-01-31 NOTE — PROGRESS NOTE ADULT - CONSTITUTIONAL DETAILS
no distress
well-nourished/well-groomed/no distress
well-nourished/no distress
well-nourished/well-groomed/no distress
well-groomed/no distress
well-nourished/no distress/well-groomed

## 2018-01-31 NOTE — DISCHARGE NOTE ADULT - SECONDARY DIAGNOSIS.
Malignant neoplasm of right female breast, unspecified estrogen receptor status, unspecified site of breast Hypertension Liver metastases

## 2018-01-31 NOTE — DISCHARGE NOTE ADULT - CARE PROVIDER_API CALL
Geetha Montemayor), Hematology; Internal Medicine  [address] [facility]  [address] [place] [address]  Phone: [phone]  Fax: [phone]

## 2018-01-31 NOTE — PROGRESS NOTE ADULT - ASSESSMENT
72F with metastatic R breast cancer to the liver with associated liver tests.  Fever without localizing symptoms.  Negative chest xray.  All cultures are negative.  Possible tumor fever, though this is a diagnosis of exclusion.  No definite infection.    To complete 5 days IV antibiotics today  d/c planning
This is a 72F with history of R breast cancer with mets to liver and HTN who presents to the hospital with complaints of fevers, worsening cough and generalized weakness. Found to have SIRS with unclear infectious source.

## 2018-01-31 NOTE — PROGRESS NOTE ADULT - ATTENDING COMMENTS
ID input appreciated, completing short course abx.  Likely all tumor related.  Palliative f/u appreciated as well.  D/C planning, with outpt f/u with her oncologist, dr. enrique
72F PMH metastatic breast cancer to liver, HTN s/p recent admission 1/16 - 1/19 for fever and leukocytosis presents for fevers, cough, dyspnea. Pt was just discharged with similar symptoms during which time she was empirically treated with levaquin with all cultures negative to date. Now again febrile to 102 with cough, leukocytosis WBC 27., SIRS. --  --f/u fungal cultures  -esr IS ELEVATED    CHECK   ECHO  - RVP negative  - CXR with low lung volumes (a poor inspiratory film)  - on broad spectrum antibiotics vanco and cefepime   - procalcitonin level  elevated , if normal then would be inclined to d/c antibiotics if all cultures remain negative  - unclear if fevers are due to underlying infection vs due to underlying malignancy, for now will empirically treat for infectious etiologies   - follow up blood and urine cx
72F PMH metastatic breast cancer to liver, HTN s/p recent admission 1/16 - 1/19 for fever and leukocytosis presents for fevers, cough, dyspnea. Pt was just discharged with similar symptoms during which time she was empirically treated with levaquin with all cultures negative to date. Now again febrile to 102 with cough, leukocytosis WBC 28., SIRS.     - RVP negative  - CXR with low lung volumes (a poor inspiratory film)  - on broad spectrum antibiotics vanco and cefepime   - check a procalcitonin level, if normal then would be inclined to d/c antibiotics if all cultures remain negative  - unclear if fevers are due to underlying infection vs due to underlying malignancy, for now will empirically treat for infectious etiologies   - follow up blood and urine cx
72F PMH metastatic breast cancer to liver, HTN s/p recent admission 1/16 - 1/19 for fever and leukocytosis presents for fevers, cough, dyspnea. Pt was just discharged with similar symptoms during which time she was empirically treated with levaquin with all cultures negative to date. Now again febrile to 102 with cough, leukocytosis WBC 28., SIRS.   -esr in am  - RVP negative  - CXR with low lung volumes (a poor inspiratory film)  - on broad spectrum antibiotics vanco and cefepime   - check a procalcitonin level, if normal then would be inclined to d/c antibiotics if all cultures remain negative  - unclear if fevers are due to underlying infection vs due to underlying malignancy, for now will empirically treat for infectious etiologies   - follow up blood and urine cx
Pt completed abx course.  Likely tumor fever.  Stable for discharge, for f/u with her oncologist, Dr. Montemayor
met breast ca, extensive hepatic mets, recent admission and tx with abx for a week, no source infection found.  now again with same, fever, leukocytosis.  I suspect tumor related, though a procalc was sent and is high  will ask ID for eval  palliative eval as well
met breast ca, extensive hepatic mets, recent admission and tx with abx for a week, no source infection found.  now again with same, fever, leukocytosis.  I suspect tumor related, though a procalc was sent and is high  will ask ID for eval  palliative eval as well

## 2018-01-31 NOTE — DISCHARGE NOTE ADULT - CARE PLAN
Principal Discharge DX:	Fever  Goal:	No further fevers  Assessment and plan of treatment:	You were treated with antibiotics here for 5 days   All your cultures were negative and we believe that the cause of the fevers may be the from the tumors   Please follow up with Dr. Montemayor  Secondary Diagnosis:	Malignant neoplasm of right female breast, unspecified estrogen receptor status, unspecified site of breast  Assessment and plan of treatment:	Please follow up with Dr Montemayor  Secondary Diagnosis:	Hypertension  Assessment and plan of treatment:	Your blood pressure has been stable with no medication   Follow up with your PMD to monitor your blood pressure  Secondary Diagnosis:	Liver metastases  Assessment and plan of treatment:	Follow up with Dr Montemayor

## 2018-01-31 NOTE — DISCHARGE NOTE ADULT - PLAN OF CARE
No further fevers You were treated with antibiotics here for 5 days   All your cultures were negative and we believe that the cause of the fevers may be the from the tumors   Please follow up with Dr. Montemayor Please follow up with Dr Montemayor Your blood pressure has been stable with no medication   Follow up with your PMD to monitor your blood pressure Follow up with Dr Montemayor

## 2018-01-31 NOTE — PROGRESS NOTE ADULT - PROBLEM SELECTOR PLAN 1
- Pt with significant fevers, tachycardia, and leukocytosis but no identifiable source of infection.   - Receiving vanc/cefepime , while there is no identifiable sources of infection at this time given her significant symptoms would still cover her with empiric abx with vanc/cefepime, will hold azithromycin for now  -Viral sx have improved/gone   -Procalcitonin  13.43  - Will place on prn tylenol/robitussin/lozenge prns  - Consideration that her SIRS symptoms could be 2/2 her cancer but that would not explain her cough/sore throat/nasal congestion so this is less likely as a cause of her symptoms  - VS q4h  - suspect patient's CPK elevation might be related to her abdominal pain from cough/?myalgias, pt without chest pain and EKG without acute findings other than tachycardia  -fungal cultures done and pending   - ESR- 105  - ID consult called  - Pt will finish 5 day course of abx and d.c home today

## 2018-01-31 NOTE — PROGRESS NOTE ADULT - SUBJECTIVE AND OBJECTIVE BOX
CHIEF COMPLAINT:    Interval Events:    REVIEW OF SYSTEMS:  Constitutional:   Eyes:  ENT:  CV:  Resp:  GI:  :  MSK:  Integumentary:  Neurological:  Psychiatric:  Endocrine:  Hematologic/Lymphatic:  Allergic/Immunologic:  [ ] All other systems negative  [ ] Unable to assess ROS because ________    OBJECTIVE:  ICU Vital Signs Last 24 Hrs  T(C): 36.9 (31 Jan 2018 05:01), Max: 36.9 (31 Jan 2018 05:01)  T(F): 98.4 (31 Jan 2018 05:01), Max: 98.4 (31 Jan 2018 05:01)  HR: 86 (31 Jan 2018 05:01) (86 - 92)  BP: 146/84 (31 Jan 2018 05:01) (124/67 - 146/84)  BP(mean): --  ABP: --  ABP(mean): --  RR: 16 (31 Jan 2018 05:01) (16 - 18)  SpO2: 100% (31 Jan 2018 05:01) (100% - 100%)        CAPILLARY BLOOD GLUCOSE          PHYSICAL EXAM:  General:   HEENT:   Lymph Nodes:  Neck:   Respiratory:   Cardiovascular:   Abdomen:   Extremities:   Skin:   Neurological:  Psychiatry:    HOSPITAL MEDICATIONS:  MEDICATIONS  (STANDING):  benzonatate 100 milliGRAM(s) Oral three times a day  cefepime  IVPB 2000 milliGRAM(s) IV Intermittent every 8 hours  chlorhexidine 4% Liquid 1 Application(s) Topical daily  enoxaparin Injectable 40 milliGRAM(s) SubCutaneous every 24 hours  sodium chloride 0.9%. 1000 milliLiter(s) (50 mL/Hr) IV Continuous <Continuous>  vancomycin  IVPB 1000 milliGRAM(s) IV Intermittent every 12 hours    MEDICATIONS  (PRN):  acetaminophen   Tablet 650 milliGRAM(s) Oral every 6 hours PRN For Temp greater than 38.5 C (101.3 F)  acetaminophen   Tablet. 650 milliGRAM(s) Oral every 6 hours PRN Moderate Pain (4 - 6)  benzocaine 15 mG/menthol 3.6 mG Lozenge 1 Lozenge Oral every 1 hour PRN Sore Throat  guaiFENesin    Syrup 200 milliGRAM(s) Oral every 6 hours PRN Cough      LABS:                        8.3    17.17 )-----------( 332      ( 30 Jan 2018 05:26 )             24.8     01-30    134<L>  |  100  |  18  ----------------------------<  86  4.5   |  25  |  0.84    Ca    8.8      30 Jan 2018 05:26  Phos  2.7     01-30  Mg     2.1     01-30    TPro  6.6  /  Alb  2.3<L>  /  TBili  1.4<H>  /  DBili  x   /  AST  51<H>  /  ALT  19  /  AlkPhos  272<H>  01-30              MICROBIOLOGY:     RADIOLOGY:  [ ] Reviewed and interpreted by me    PULMONARY FUNCTION TESTS:    EKG: CHIEF COMPLAINT: Patient is a 72y old  Female who presents with a chief complaint of Fevers, cough, generalized weakness (31 Jan 2018 11:36)      Interval Events: none    REVIEW OF SYSTEMS:  Constitutional: Feeling better, no fever or chills   Eyes:  ENT:  CV: No chest pain   Resp: Denies sob   GI: no diarrhea   [x ] All other systems negative    OBJECTIVE:  ICU Vital Signs Last 24 Hrs  T(C): 36.9 (31 Jan 2018 05:01), Max: 36.9 (31 Jan 2018 05:01)  T(F): 98.4 (31 Jan 2018 05:01), Max: 98.4 (31 Jan 2018 05:01)  HR: 86 (31 Jan 2018 05:01) (86 - 92)  BP: 146/84 (31 Jan 2018 05:01) (124/67 - 146/84)  BP(mean): --  ABP: --  ABP(mean): --  RR: 16 (31 Jan 2018 05:01) (16 - 18)  SpO2: 100% (31 Jan 2018 05:01) (100% - 100%)        CAPILLARY BLOOD GLUCOSE              HOSPITAL MEDICATIONS:  MEDICATIONS  (STANDING):  benzonatate 100 milliGRAM(s) Oral three times a day  cefepime  IVPB 2000 milliGRAM(s) IV Intermittent every 8 hours  chlorhexidine 4% Liquid 1 Application(s) Topical daily  enoxaparin Injectable 40 milliGRAM(s) SubCutaneous every 24 hours  sodium chloride 0.9%. 1000 milliLiter(s) (50 mL/Hr) IV Continuous <Continuous>  vancomycin  IVPB 1000 milliGRAM(s) IV Intermittent every 12 hours    MEDICATIONS  (PRN):  acetaminophen   Tablet 650 milliGRAM(s) Oral every 6 hours PRN For Temp greater than 38.5 C (101.3 F)  acetaminophen   Tablet. 650 milliGRAM(s) Oral every 6 hours PRN Moderate Pain (4 - 6)  benzocaine 15 mG/menthol 3.6 mG Lozenge 1 Lozenge Oral every 1 hour PRN Sore Throat  guaiFENesin    Syrup 200 milliGRAM(s) Oral every 6 hours PRN Cough      LABS:                        8.3    17.17 )-----------( 332      ( 30 Jan 2018 05:26 )             24.8     01-30    134<L>  |  100  |  18  ----------------------------<  86  4.5   |  25  |  0.84    Ca    8.8      30 Jan 2018 05:26  Phos  2.7     01-30  Mg     2.1     01-30    TPro  6.6  /  Alb  2.3<L>  /  TBili  1.4<H>  /  DBili  x   /  AST  51<H>  /  ALT  19  /  AlkPhos  272<H>  01-30              MICROBIOLOGY:     RADIOLOGY:  [ ] Reviewed and interpreted by me    PULMONARY FUNCTION TESTS:    EKG:

## 2018-01-31 NOTE — DISCHARGE NOTE ADULT - MEDICATION SUMMARY - MEDICATIONS TO STOP TAKING
I will STOP taking the medications listed below when I get home from the hospital:    dilTIAZem 180 mg/24 hours oral capsule, extended release  -- 1 cap(s) by mouth once a day    Levaquin 500 mg oral tablet  -- 1 tab(s) by mouth once a day   -- Avoid prolonged or excessive exposure to direct and/or artificial sunlight while taking this medication.  Do not take dairy products, antacids, or iron preparations within one hour of this medication.  Finish all this medication unless otherwise directed by prescriber.  May cause drowsiness or dizziness.  Medication should be taken with plenty of water.

## 2018-01-31 NOTE — CHART NOTE - NSCHARTNOTEFT_GEN_A_CORE
Goals established with patient. Patient is pursuing treatment. She assigned Mustapha Raphael ([phone]) and Duane Boothman ([phone]) as her Health Care Proxy. Palliative will sign off at this time. Please reconsult as needed.

## 2018-01-31 NOTE — PROGRESS NOTE ADULT - NEUROLOGICAL DETAILS
alert and oriented x 3
alert and oriented x 3/responds to verbal commands/responds to pain
responds to verbal commands/alert and oriented x 3/responds to pain
alert and oriented x 3/responds to pain/responds to verbal commands
alert and oriented x 3

## 2018-01-31 NOTE — PROGRESS NOTE ADULT - NS MD HP PULSE DORSALIS
right normal/left normal
right normal/left normal
left normal/right normal

## 2018-01-31 NOTE — DISCHARGE NOTE ADULT - PATIENT PORTAL LINK FT
“You can access the [facility] Patient Portal, offered by [facility], by registering with the following website: [email or web address]”

## 2018-01-31 NOTE — PROGRESS NOTE ADULT - PROBLEM SELECTOR PLAN 4
- Lovenox for DVT ppx

## 2018-01-31 NOTE — PROGRESS NOTE ADULT - PROBLEM SELECTOR PROBLEM 2
Malignant neoplasm of right female breast, unspecified estrogen receptor status, unspecified site of breast

## 2018-01-31 NOTE — PROGRESS NOTE ADULT - NS MD HP PULSE RADIAL
left normal/right normal
right normal/left normal
left normal/right normal
right normal/left normal
left normal/right normal
right normal/left normal

## 2018-01-31 NOTE — PROGRESS NOTE ADULT - PROBLEM SELECTOR PROBLEM 1
SIRS (systemic inflammatory response syndrome)

## 2018-01-31 NOTE — PROGRESS NOTE ADULT - GASTROINTESTINAL DETAILS
bowel sounds normal/nontender/no distention/soft
soft/no distention/bowel sounds normal/nontender
soft/no distention
bowel sounds normal/no distention/soft/nontender
nontender/soft/normal
bowel sounds normal/soft/nontender
soft/bowel sounds normal/nontender/no distention

## 2018-01-31 NOTE — PROGRESS NOTE ADULT - PROVIDER SPECIALTY LIST ADULT
Infectious Disease
Pulmonology

## 2018-01-31 NOTE — PROGRESS NOTE ADULT - RS GEN PE MLT RESP DETAILS PC
airway patent/breath sounds equal
breath sounds equal/airway patent/clear to auscultation bilaterally
clear to auscultation bilaterally/respirations non-labored/good air movement
breath sounds equal/airway patent/clear to auscultation bilaterally
airway patent/breath sounds equal/clear to auscultation bilaterally
airway patent/clear to auscultation bilaterally/breath sounds equal
airway patent/clear to auscultation bilaterally/breath sounds equal

## 2018-01-31 NOTE — DISCHARGE NOTE ADULT - MEDICATION SUMMARY - MEDICATIONS TO TAKE
I will START or STAY ON the medications listed below when I get home from the hospital:    guaiFENesin 100 mg/5 mL oral liquid  -- 10 milliliter(s) by mouth every 6 hours, As needed, Cough  -- Indication: For cough

## 2018-01-31 NOTE — PROGRESS NOTE ADULT - MS EXT PE MLT D E PC
no clubbing/no pedal edema/no cyanosis
no cyanosis/no pedal edema/no clubbing
no pedal edema/no cyanosis/no clubbing
no pedal edema/no cyanosis/no clubbing
no cyanosis/no clubbing
no clubbing/no pedal edema/no cyanosis

## 2018-01-31 NOTE — PROGRESS NOTE ADULT - GUM GEN PE MLT EXAM PC
not examined
Normal genitalia; no lesions; no discharge
not examined

## 2018-02-02 ENCOUNTER — RESULT REVIEW (OUTPATIENT)
Age: 73
End: 2018-02-02

## 2018-04-28 ENCOUNTER — APPOINTMENT (OUTPATIENT)
Dept: ULTRASOUND IMAGING | Facility: IMAGING CENTER | Age: 73
End: 2018-04-28

## 2018-05-01 ENCOUNTER — APPOINTMENT (OUTPATIENT)
Dept: MAMMOGRAPHY | Facility: IMAGING CENTER | Age: 73
End: 2018-05-01

## 2018-05-14 ENCOUNTER — APPOINTMENT (OUTPATIENT)
Dept: ULTRASOUND IMAGING | Facility: IMAGING CENTER | Age: 73
End: 2018-05-14
Payer: MEDICARE

## 2018-05-14 ENCOUNTER — OUTPATIENT (OUTPATIENT)
Dept: OUTPATIENT SERVICES | Facility: HOSPITAL | Age: 73
LOS: 1 days | End: 2018-05-14
Payer: MEDICARE

## 2018-05-14 ENCOUNTER — APPOINTMENT (OUTPATIENT)
Dept: MAMMOGRAPHY | Facility: IMAGING CENTER | Age: 73
End: 2018-05-14
Payer: MEDICARE

## 2018-05-14 DIAGNOSIS — Z90.11 ACQUIRED ABSENCE OF RIGHT BREAST AND NIPPLE: Chronic | ICD-10-CM

## 2018-05-14 DIAGNOSIS — Z00.8 ENCOUNTER FOR OTHER GENERAL EXAMINATION: ICD-10-CM

## 2018-05-14 PROCEDURE — 77065 DX MAMMO INCL CAD UNI: CPT | Mod: 26,LT

## 2018-05-14 PROCEDURE — G0279: CPT | Mod: 26

## 2018-05-14 PROCEDURE — G0279: CPT

## 2018-05-14 PROCEDURE — 77065 DX MAMMO INCL CAD UNI: CPT

## 2018-12-15 ENCOUNTER — EMERGENCY (EMERGENCY)
Facility: HOSPITAL | Age: 73
LOS: 1 days | Discharge: HOME | End: 2018-12-15
Attending: EMERGENCY MEDICINE | Admitting: EMERGENCY MEDICINE
Payer: MEDICARE

## 2018-12-15 VITALS
OXYGEN SATURATION: 100 % | SYSTOLIC BLOOD PRESSURE: 156 MMHG | RESPIRATION RATE: 16 BRPM | HEART RATE: 87 BPM | DIASTOLIC BLOOD PRESSURE: 69 MMHG | TEMPERATURE: 98 F

## 2018-12-15 DIAGNOSIS — Z90.11 ACQUIRED ABSENCE OF RIGHT BREAST AND NIPPLE: Chronic | ICD-10-CM

## 2018-12-15 PROBLEM — C50.911 MALIGNANT NEOPLASM OF UNSPECIFIED SITE OF RIGHT FEMALE BREAST: Chronic | Status: ACTIVE | Noted: 2018-01-26

## 2018-12-15 LAB
ALBUMIN SERPL ELPH-MCNC: 3.8 G/DL — SIGNIFICANT CHANGE UP (ref 3.3–5)
ALP SERPL-CCNC: 115 U/L — SIGNIFICANT CHANGE UP (ref 40–120)
ALT FLD-CCNC: 21 U/L — SIGNIFICANT CHANGE UP (ref 4–33)
APTT BLD: 19.1 SEC — LOW (ref 27.5–36.3)
AST SERPL-CCNC: 35 U/L — HIGH (ref 4–32)
BASOPHILS # BLD AUTO: 0.06 K/UL — SIGNIFICANT CHANGE UP (ref 0–0.2)
BASOPHILS NFR BLD AUTO: 0.9 % — SIGNIFICANT CHANGE UP (ref 0–2)
BILIRUB SERPL-MCNC: 0.3 MG/DL — SIGNIFICANT CHANGE UP (ref 0.2–1.2)
BUN SERPL-MCNC: 12 MG/DL — SIGNIFICANT CHANGE UP (ref 7–23)
CALCIUM SERPL-MCNC: 9.9 MG/DL — SIGNIFICANT CHANGE UP (ref 8.4–10.5)
CHLORIDE SERPL-SCNC: 100 MMOL/L — SIGNIFICANT CHANGE UP (ref 98–107)
CO2 SERPL-SCNC: 23 MMOL/L — SIGNIFICANT CHANGE UP (ref 22–31)
CREAT SERPL-MCNC: 0.84 MG/DL — SIGNIFICANT CHANGE UP (ref 0.5–1.3)
D DIMER BLD IA.RAPID-MCNC: 689 NG/ML — SIGNIFICANT CHANGE UP
EOSINOPHIL # BLD AUTO: 0.21 K/UL — SIGNIFICANT CHANGE UP (ref 0–0.5)
EOSINOPHIL NFR BLD AUTO: 3 % — SIGNIFICANT CHANGE UP (ref 0–6)
GLUCOSE SERPL-MCNC: 146 MG/DL — HIGH (ref 70–99)
HCT VFR BLD CALC: 39 % — SIGNIFICANT CHANGE UP (ref 34.5–45)
HGB BLD-MCNC: 12.1 G/DL — SIGNIFICANT CHANGE UP (ref 11.5–15.5)
IMM GRANULOCYTES # BLD AUTO: 0.03 # — SIGNIFICANT CHANGE UP
IMM GRANULOCYTES NFR BLD AUTO: 0.4 % — SIGNIFICANT CHANGE UP (ref 0–1.5)
INR BLD: 1.07 — SIGNIFICANT CHANGE UP (ref 0.88–1.17)
LYMPHOCYTES # BLD AUTO: 2.02 K/UL — SIGNIFICANT CHANGE UP (ref 1–3.3)
LYMPHOCYTES # BLD AUTO: 29.1 % — SIGNIFICANT CHANGE UP (ref 13–44)
MCHC RBC-ENTMCNC: 27.6 PG — SIGNIFICANT CHANGE UP (ref 27–34)
MCHC RBC-ENTMCNC: 31 % — LOW (ref 32–36)
MCV RBC AUTO: 89 FL — SIGNIFICANT CHANGE UP (ref 80–100)
MONOCYTES # BLD AUTO: 0.48 K/UL — SIGNIFICANT CHANGE UP (ref 0–0.9)
MONOCYTES NFR BLD AUTO: 6.9 % — SIGNIFICANT CHANGE UP (ref 2–14)
NEUTROPHILS # BLD AUTO: 4.15 K/UL — SIGNIFICANT CHANGE UP (ref 1.8–7.4)
NEUTROPHILS NFR BLD AUTO: 59.7 % — SIGNIFICANT CHANGE UP (ref 43–77)
NRBC # FLD: 0 — SIGNIFICANT CHANGE UP
PLATELET # BLD AUTO: 155 K/UL — SIGNIFICANT CHANGE UP (ref 150–400)
PMV BLD: 10.6 FL — SIGNIFICANT CHANGE UP (ref 7–13)
POTASSIUM SERPL-MCNC: 4.6 MMOL/L — SIGNIFICANT CHANGE UP (ref 3.5–5.3)
POTASSIUM SERPL-SCNC: 4.6 MMOL/L — SIGNIFICANT CHANGE UP (ref 3.5–5.3)
PROT SERPL-MCNC: 7.5 G/DL — SIGNIFICANT CHANGE UP (ref 6–8.3)
PROTHROM AB SERPL-ACNC: 12.2 SEC — SIGNIFICANT CHANGE UP (ref 9.8–13.1)
RBC # BLD: 4.38 M/UL — SIGNIFICANT CHANGE UP (ref 3.8–5.2)
RBC # FLD: 15.5 % — HIGH (ref 10.3–14.5)
SODIUM SERPL-SCNC: 134 MMOL/L — LOW (ref 135–145)
TROPONIN T, HIGH SENSITIVITY: 9 NG/L — SIGNIFICANT CHANGE UP (ref ?–14)
WBC # BLD: 6.95 K/UL — SIGNIFICANT CHANGE UP (ref 3.8–10.5)
WBC # FLD AUTO: 6.95 K/UL — SIGNIFICANT CHANGE UP (ref 3.8–10.5)

## 2018-12-15 PROCEDURE — 99284 EMERGENCY DEPT VISIT MOD MDM: CPT | Mod: GC

## 2018-12-15 PROCEDURE — 71275 CT ANGIOGRAPHY CHEST: CPT | Mod: 26

## 2018-12-15 PROCEDURE — 71046 X-RAY EXAM CHEST 2 VIEWS: CPT | Mod: 26

## 2018-12-15 NOTE — ED PROVIDER NOTE - MUSCULOSKELETAL, MLM
Spine appears normal, range of motion is not limited, no muscle or joint tenderness. ~1+ L pitting edema.

## 2018-12-15 NOTE — ED ADULT NURSE NOTE - OBJECTIVE STATEMENT
pt received to room #4 with c/o right sided tightness under the right breast radiating to the back. pt states symptoms occurred while trying to get into the shower, states symptoms have since resolved. denies SON, n/v/d at present and time of incident. IV placed, labs drawn and sent pt seen by MD. family at bedside, will cont to monitor.

## 2018-12-15 NOTE — ED PROVIDER NOTE - NEUROLOGICAL, MLM
Alert and oriented, no gross motor or sensory deficits other than tingling (B) feet (being eval'd by outside Neuro).

## 2018-12-15 NOTE — ED ADULT NURSE NOTE - NSIMPLEMENTINTERV_GEN_ALL_ED
Implemented All Universal Safety Interventions:  [place] to call system. Call bell, personal items and telephone within reach. Instruct patient to call for assistance. Room bathroom lighting operational. Non-slip footwear when patient is off stretcher. Physically safe environment: no spills, clutter or unnecessary equipment. Stretcher in lowest position, wheels locked, appropriate side rails in place.

## 2018-12-15 NOTE — ED PROVIDER NOTE - OBJECTIVE STATEMENT
Miguel: 73 F, h/o breat cancer (2003), HTN. 1 day mid-level (B) back tightness radiating toward front. Cancer (liver lesions), treated by [facility] (Onc = Dr. Montemayor, PCP Dr. Holloway). No h/o VTE. No SOB. No infectious Sx.    Meds:  lisinopril  gabapentin  cardia  tramadol

## 2018-12-15 NOTE — ED PROVIDER NOTE - NSFOLLOWUPINSTRUCTIONS_ED_ALL_ED_FT
Follow up with your primary medical doctor and your oncologist within 24 hours. Return to the emergency department immediately for any urgent concerns. Follow up with your oncology team and primary doctor in 24 hours. Return to the emergency department promptly for any urgent concerns.

## 2018-12-15 NOTE — ED ADULT TRIAGE NOTE - CHIEF COMPLAINT QUOTE
Patient c/o a tightness around her back to her chest area. Also bilateral thigh numbness since last week.  Cancer patient last chemo a month ago.

## 2018-12-15 NOTE — ED PROVIDER NOTE - ATTENDING CONTRIBUTION TO CARE
I performed a face-to-face evaluation of the patient and performed a history and physical examination. I agree with the history and physical examination.    Miguel: DDx: malignancy-related pain, PE, CAD. Less likely PNA. Check trop, EKG, CXR, d-dimer.

## 2018-12-16 VITALS
OXYGEN SATURATION: 100 % | SYSTOLIC BLOOD PRESSURE: 145 MMHG | DIASTOLIC BLOOD PRESSURE: 66 MMHG | RESPIRATION RATE: 16 BRPM | HEART RATE: 69 BPM

## 2018-12-16 LAB — TROPONIN T, HIGH SENSITIVITY: 8 NG/L — SIGNIFICANT CHANGE UP (ref ?–14)

## 2018-12-16 NOTE — ED ADULT NURSE REASSESSMENT NOTE - CONDITION
reports geeling better. pt given dc instructions and hl removed by md. rpt tropnin done after.  pt called with results.  did not wish to wait.  reports pain subsided on its own.

## 2019-02-12 NOTE — PATIENT PROFILE ADULT. - ABILITY TO HEAR (WITH HEARING AID OR HEARING APPLIANCE IF NORMALLY USED):
Adequate: hears normal conversation without difficulty Spoke with patient she wanted to confirm we had her medical history updated with stent she had placed in [place] on 8/7/2012, after reviewing her record I informed her we do have that updated in her file. Patient verbalized understanding and agreeable to POC.

## 2019-06-07 ENCOUNTER — OUTPATIENT (OUTPATIENT)
Dept: OUTPATIENT SERVICES | Facility: HOSPITAL | Age: 74
LOS: 1 days | End: 2019-06-07
Payer: MEDICARE

## 2019-06-07 ENCOUNTER — APPOINTMENT (OUTPATIENT)
Dept: MAMMOGRAPHY | Facility: IMAGING CENTER | Age: 74
End: 2019-06-07
Payer: MEDICARE

## 2019-06-07 ENCOUNTER — APPOINTMENT (OUTPATIENT)
Dept: ULTRASOUND IMAGING | Facility: IMAGING CENTER | Age: 74
End: 2019-06-07
Payer: MEDICARE

## 2019-06-07 DIAGNOSIS — Z90.11 ACQUIRED ABSENCE OF RIGHT BREAST AND NIPPLE: Chronic | ICD-10-CM

## 2019-06-07 DIAGNOSIS — Z00.00 ENCOUNTER FOR GENERAL ADULT MEDICAL EXAMINATION WITHOUT ABNORMAL FINDINGS: ICD-10-CM

## 2019-06-07 PROCEDURE — 77067 SCR MAMMO BI INCL CAD: CPT | Mod: 26,LT,52

## 2019-06-07 PROCEDURE — 77063 BREAST TOMOSYNTHESIS BI: CPT

## 2019-06-07 PROCEDURE — 77067 SCR MAMMO BI INCL CAD: CPT

## 2019-06-07 PROCEDURE — 77063 BREAST TOMOSYNTHESIS BI: CPT | Mod: 26,52

## 2021-09-14 NOTE — ED ADULT NURSE NOTE - NS ED PATIENT SAFETY CONCERN
Daily Note     Today's date: 2021  Patient name: Christi Beard  : 2014  MRN: [id number]  Referring provider: Vikas Church MD  Dx:   Encounter Diagnosis     ICD-10-CM    1  Development delay  R62 50    2  Spastic monoplegic cerebral palsy ([facility] [address] )  G80 1                   Subjective: Corrina Jones arrived with his mother to physical therapy today with reports that he had a cold last week but is feeling better  He will continue to take inhaler as needed and hold on allergy medication has decided upon by pulmonologist and family after recent appointment  Corrina Jones did very well with exploring a local [place] playground with his brother, in comparison to use of a playground last year  Corrina Jones wears a facemask in the session, with therapist wearing KN95 mask and goggles  Corrina Jones passed all COVID-19 screening questions and temperature check  Objective:  - Review recent re-evaluation with mother present   - Referral to developmental optometry  - Total gym leg press level 7, 2 x 10 reps  - Total gym pull-ups 3 x 8 reps at level 5  - Quadruped for alternating arm reaching while positioned on ligia bench to throw rings into target  - Standing Bop-It 2 x 10 reps with 3 pound weighted bar  - Animal walks:   - 4 x 12 feet bear crawl   - 2 x 12 feet forward crab walk   - 1 x 12 feet giraffe and frog each  - DL bridge 3 x 10 seconds    Assessment: Tolerated treatment well  Patient would benefit from continued PT  Ample time at beginning of session focused on review of recent re-evaluation with mother, to formulate plan of focus in outpatient physical therapy sessions moving forwards  Therapist and mother discussed referral to developmental optometry to address Scottie's apparent visual motor deficits, to which mother was in agreement with  Exercises overall in today's session focused on strengthening, as Corrina Jones scored well below age-related normative values as compared to peers with strength, per recent standardized testing    Focus on extensor muscle strengthening is essential to counter balance Scottie's preference for over utilization of his flexor muscles throughout his body more naturally during play  This likely relates to a medical history significant for feeding issues and a trach  Lambert Samples quickly with quadruped activity and frequently compensated through elbow hyperextension (left greater than right), and positioning lower extremities to seek added support and stability through crisscrossing or resting bottom onto heels  He tolerated progression of Bop-It to use of a weighted bar, although had less active push through the bar as this was a greater challenge when moved further away from his base center of mass  Marilee Luz worked hard with animal walks but frequently compensates due to difficulty in tolerance to weight-bearing through arms and also core muscles strength  Plan: Continue per plan of care  It is imperative that Marilee Luz receives skilled physical therapy services 1 x per week for at least the next 6 months to address his gross motor delay, balance difficulties, and postural deviations for improved ability to interact with same aged peers and to prevent future orthopedic injury associated with postural changes  Physical therapy sessions with benefit from being held on land in addition to in an aquatic environment  No

## 2022-06-20 NOTE — PATIENT PROFILE ADULT. - PRO INTERPRETER NEED 2
CC:  Ambar Christy is here today for Office Visit and Vertigo     Medications: medications verified, no change  Refills needed today?  NO  denies Latex allergy or sensitivity  Patient would like communication of their results via:    Cell Phone:   Telephone Information:   Mobile [phone]     Okay to leave a message containing results? Yes  Tobacco history: verified  Patient's current [facility] status: Active.    Pharmacy Verified?  Yes    Health Maintenance Due   Topic Date Due   • Depression Screening  Never done   • COVID-19 Vaccine (2 - Booster for Nehemiah series) 07/15/2021       Patient is up to date, no discussion needed.           English no

## 2022-11-10 ENCOUNTER — APPOINTMENT (OUTPATIENT)
Dept: MAMMOGRAPHY | Facility: IMAGING CENTER | Age: 77
End: 2022-11-10

## 2022-11-10 ENCOUNTER — OUTPATIENT (OUTPATIENT)
Dept: OUTPATIENT SERVICES | Facility: HOSPITAL | Age: 77
LOS: 1 days | End: 2022-11-10
Payer: MEDICARE

## 2022-11-10 ENCOUNTER — APPOINTMENT (OUTPATIENT)
Dept: ULTRASOUND IMAGING | Facility: IMAGING CENTER | Age: 77
End: 2022-11-10

## 2022-11-10 DIAGNOSIS — Z90.11 ACQUIRED ABSENCE OF RIGHT BREAST AND NIPPLE: Chronic | ICD-10-CM

## 2022-11-10 DIAGNOSIS — Z00.8 ENCOUNTER FOR OTHER GENERAL EXAMINATION: ICD-10-CM

## 2022-11-10 PROCEDURE — 77063 BREAST TOMOSYNTHESIS BI: CPT | Mod: 26,52

## 2022-11-10 PROCEDURE — 77067 SCR MAMMO BI INCL CAD: CPT

## 2022-11-10 PROCEDURE — 77067 SCR MAMMO BI INCL CAD: CPT | Mod: 26,LT,52

## 2022-11-10 PROCEDURE — 76641 ULTRASOUND BREAST COMPLETE: CPT | Mod: 26,LT

## 2022-11-10 PROCEDURE — 76641 ULTRASOUND BREAST COMPLETE: CPT

## 2022-11-10 PROCEDURE — 77063 BREAST TOMOSYNTHESIS BI: CPT

## 2023-04-27 NOTE — DISCHARGE NOTE ADULT - BECAUSE OF A PHYSICAL, MENTAL OR EMOTIONAL CONDITION, DO YOU HAVE DIFFICULTY DOING  ERRANDS ALONE LIKE VISITING A DOCTOR'S OFFICE OR SHOPPING (15 YEARS AND OLDER)
4/27/2023    Tripp Haines    Chief Complaint   Patient presents with    3 Month Follow-Up     Presenting for 3 month follow up visit. HPI  History obtained from the patient. Lyssa Billy is a 49 y.o. male who presents today for 3 month follow up. Hypertension - Well controlled on  lisinopril-hydrochlorothiazide 10-12.5 mg daily    GERD - Patient did well on omeprazole 40 mg daily, and his symptoms resolved. He stopped this medication about a month ago. He has been doing fine without it so far. He states that he is even able to eat triggers such as salsa without a problem. Testosterone - Patient does admit to injecting nonprescription testosterone twice weekly. He states that he takes 1 cc of a testosterone solution that he believes is 250 mg/mL on Sundays and Thursdays of every week. He feels better on the testosterone then off of it. Health Maintenance - Patient's care gaps include COLON CANCER SCREENING, depression screening, Tdap. Patient states that he has been very busy, so he has not been able to call GI and get scheduled for an appointment yet but he intends to. PHQ-9 Total Score: 0 (4/27/2023  2:33 PM)    The 10-year ASCVD risk score (Christy RG, et al., 2019) is: 7.2%    Values used to calculate the score:      Age: 49 years      Sex: Male      Is Non- : No      Diabetic: No      Tobacco smoker: No      Systolic Blood Pressure: 159 mmHg      Is BP treated: Yes      HDL Cholesterol: 27 mg/dL      Total Cholesterol: 199 mg/dL    REVIEW OF SYMPTOMS  Review of Systems   Constitutional: Negative for chills and fever. Respiratory: Negative for cough and shortness of breath. Gastrointestinal: Negative for diarrhea and vomiting. PAST MEDICAL HISTORY  Past Medical History:   Diagnosis Date    Abnormal EKG 1/30/2023 1/17/2023 EKG is suspicious for inferior wall subendocardial injury or ischemia.     Family history of premature CAD 1/30/2023    Mother No

## 2023-12-20 NOTE — PROGRESS NOTE ADULT - PROBLEM SELECTOR PLAN 1
Bed: TH8  Expected date:   Expected time:   Means of arrival:   Comments:  sarika   - Pt with significant fevers, tachycardia, and leukocytosis but no identifiable source of infection  - Received vanc/cefepime/azithromycin in ED, while there is no identifiable sources of infection at this time given her significant symptoms would still cover her with empiric abx with vanc/cefepime, will hold azithromycin for now  - Her RVP is negative but given her constellation of symptoms (rhinorrhea, sore throat, cough) suspect she might still have a viral syndrome,   -Procalcitonin sent this afternoon   - Will place on prn tylenol/robitussin/lozenge prns  - Consideration that her SIRS symptoms could be 2/2 her cancer but that would not explain her cough/sore throat/nasal congestion so this is less likely as a cause of her symptoms  - VS q4h  - suspect patient's CPK elevation might be related to her abdominal pain from cough/?myalgias, pt without chest pain and EKG without acute findings other than tachycardia, would repeat levels in AM

## 2024-01-01 NOTE — H&P ADULT - NSHPSOCIALHISTORY_GEN_ALL_CORE
Pt remains on bubble cpap +5 with no changes made this shift.     Lives with her sons and daughters  Former smoker, used to smoke ~1ppd x 5 years, quit over 30 years ago  No current EtOH or illicit drug use

## 2024-02-01 ENCOUNTER — APPOINTMENT (OUTPATIENT)
Dept: ULTRASOUND IMAGING | Facility: IMAGING CENTER | Age: 79
End: 2024-02-01
Payer: MEDICARE

## 2024-02-01 ENCOUNTER — OUTPATIENT (OUTPATIENT)
Dept: OUTPATIENT SERVICES | Facility: HOSPITAL | Age: 79
LOS: 1 days | End: 2024-02-01
Payer: MEDICARE

## 2024-02-01 ENCOUNTER — APPOINTMENT (OUTPATIENT)
Dept: MAMMOGRAPHY | Facility: IMAGING CENTER | Age: 79
End: 2024-02-01
Payer: MEDICARE

## 2024-02-01 DIAGNOSIS — C50.919 MALIGNANT NEOPLASM OF UNSPECIFIED SITE OF UNSPECIFIED FEMALE BREAST: ICD-10-CM

## 2024-02-01 DIAGNOSIS — Z90.11 ACQUIRED ABSENCE OF RIGHT BREAST AND NIPPLE: Chronic | ICD-10-CM

## 2024-02-01 PROCEDURE — 77067 SCR MAMMO BI INCL CAD: CPT | Mod: 26,LT,52

## 2024-02-01 PROCEDURE — 77063 BREAST TOMOSYNTHESIS BI: CPT

## 2024-02-01 PROCEDURE — 77063 BREAST TOMOSYNTHESIS BI: CPT | Mod: 26,52

## 2024-02-01 PROCEDURE — 76641 ULTRASOUND BREAST COMPLETE: CPT

## 2024-02-01 PROCEDURE — 77067 SCR MAMMO BI INCL CAD: CPT

## 2024-02-01 PROCEDURE — 76641 ULTRASOUND BREAST COMPLETE: CPT | Mod: 26,LT

## 2024-02-01 PROCEDURE — 77067 SCR MAMMO BI INCL CAD: CPT | Mod: 26,52

## 2024-02-18 ENCOUNTER — EMERGENCY (EMERGENCY)
Facility: HOSPITAL | Age: 79
LOS: 1 days | Discharge: HOME | End: 2024-02-18
Attending: EMERGENCY MEDICINE | Admitting: EMERGENCY MEDICINE
Payer: MEDICARE

## 2024-02-18 VITALS
SYSTOLIC BLOOD PRESSURE: 193 MMHG | TEMPERATURE: 98 F | OXYGEN SATURATION: 98 % | RESPIRATION RATE: 16 BRPM | HEART RATE: 70 BPM | DIASTOLIC BLOOD PRESSURE: 81 MMHG

## 2024-02-18 DIAGNOSIS — Z90.11 ACQUIRED ABSENCE OF RIGHT BREAST AND NIPPLE: Chronic | ICD-10-CM

## 2024-02-18 LAB
ALBUMIN SERPL ELPH-MCNC: 3.9 G/DL — SIGNIFICANT CHANGE UP (ref 3.3–5)
ALP SERPL-CCNC: 74 U/L — SIGNIFICANT CHANGE UP (ref 40–120)
ALT FLD-CCNC: 16 U/L — SIGNIFICANT CHANGE UP (ref 4–33)
ANION GAP SERPL CALC-SCNC: 8 MMOL/L — SIGNIFICANT CHANGE UP (ref 7–14)
APTT BLD: 28.9 SEC — SIGNIFICANT CHANGE UP (ref 24.5–35.6)
AST SERPL-CCNC: 25 U/L — SIGNIFICANT CHANGE UP (ref 4–32)
BASOPHILS # BLD AUTO: 0.04 K/UL — SIGNIFICANT CHANGE UP (ref 0–0.2)
BASOPHILS NFR BLD AUTO: 0.6 % — SIGNIFICANT CHANGE UP (ref 0–2)
BILIRUB SERPL-MCNC: 0.4 MG/DL — SIGNIFICANT CHANGE UP (ref 0.2–1.2)
BUN SERPL-MCNC: 18 MG/DL — SIGNIFICANT CHANGE UP (ref 7–23)
CALCIUM SERPL-MCNC: 9.7 MG/DL — SIGNIFICANT CHANGE UP (ref 8.4–10.5)
CHLORIDE SERPL-SCNC: 103 MMOL/L — SIGNIFICANT CHANGE UP (ref 98–107)
CO2 SERPL-SCNC: 28 MMOL/L — SIGNIFICANT CHANGE UP (ref 22–31)
CREAT SERPL-MCNC: 0.78 MG/DL — SIGNIFICANT CHANGE UP (ref 0.5–1.3)
EGFR: 78 ML/MIN/1.73M2 — SIGNIFICANT CHANGE UP
EOSINOPHIL # BLD AUTO: 0.18 K/UL — SIGNIFICANT CHANGE UP (ref 0–0.5)
EOSINOPHIL NFR BLD AUTO: 2.7 % — SIGNIFICANT CHANGE UP (ref 0–6)
GLUCOSE SERPL-MCNC: 105 MG/DL — HIGH (ref 70–99)
HCT VFR BLD CALC: 40.5 % — SIGNIFICANT CHANGE UP (ref 34.5–45)
HGB BLD-MCNC: 12.9 G/DL — SIGNIFICANT CHANGE UP (ref 11.5–15.5)
IANC: 4.24 K/UL — SIGNIFICANT CHANGE UP (ref 1.8–7.4)
IMM GRANULOCYTES NFR BLD AUTO: 0.3 % — SIGNIFICANT CHANGE UP (ref 0–0.9)
INR BLD: 1.02 RATIO — SIGNIFICANT CHANGE UP (ref 0.85–1.18)
LYMPHOCYTES # BLD AUTO: 1.91 K/UL — SIGNIFICANT CHANGE UP (ref 1–3.3)
LYMPHOCYTES # BLD AUTO: 28.2 % — SIGNIFICANT CHANGE UP (ref 13–44)
MCHC RBC-ENTMCNC: 28 PG — SIGNIFICANT CHANGE UP (ref 27–34)
MCHC RBC-ENTMCNC: 31.9 GM/DL — LOW (ref 32–36)
MCV RBC AUTO: 87.9 FL — SIGNIFICANT CHANGE UP (ref 80–100)
MONOCYTES # BLD AUTO: 0.38 K/UL — SIGNIFICANT CHANGE UP (ref 0–0.9)
MONOCYTES NFR BLD AUTO: 5.6 % — SIGNIFICANT CHANGE UP (ref 2–14)
NEUTROPHILS # BLD AUTO: 4.24 K/UL — SIGNIFICANT CHANGE UP (ref 1.8–7.4)
NEUTROPHILS NFR BLD AUTO: 62.6 % — SIGNIFICANT CHANGE UP (ref 43–77)
NRBC # BLD: 0 /100 WBCS — SIGNIFICANT CHANGE UP (ref 0–0)
NRBC # FLD: 0 K/UL — SIGNIFICANT CHANGE UP (ref 0–0)
PLATELET # BLD AUTO: 190 K/UL — SIGNIFICANT CHANGE UP (ref 150–400)
POTASSIUM SERPL-MCNC: 4.3 MMOL/L — SIGNIFICANT CHANGE UP (ref 3.5–5.3)
POTASSIUM SERPL-SCNC: 4.3 MMOL/L — SIGNIFICANT CHANGE UP (ref 3.5–5.3)
PROT SERPL-MCNC: 7.4 G/DL — SIGNIFICANT CHANGE UP (ref 6–8.3)
PROTHROM AB SERPL-ACNC: 11.4 SEC — SIGNIFICANT CHANGE UP (ref 9.5–13)
RBC # BLD: 4.61 M/UL — SIGNIFICANT CHANGE UP (ref 3.8–5.2)
RBC # FLD: 14.1 % — SIGNIFICANT CHANGE UP (ref 10.3–14.5)
SODIUM SERPL-SCNC: 139 MMOL/L — SIGNIFICANT CHANGE UP (ref 135–145)
TROPONIN T, HIGH SENSITIVITY RESULT: 10 NG/L — SIGNIFICANT CHANGE UP
WBC # BLD: 6.77 K/UL — SIGNIFICANT CHANGE UP (ref 3.8–10.5)
WBC # FLD AUTO: 6.77 K/UL — SIGNIFICANT CHANGE UP (ref 3.8–10.5)

## 2024-02-18 PROCEDURE — 70496 CT ANGIOGRAPHY HEAD: CPT | Mod: 26,MA

## 2024-02-18 PROCEDURE — 99283 EMERGENCY DEPT VISIT LOW MDM: CPT

## 2024-02-18 PROCEDURE — 0042T: CPT | Mod: MA

## 2024-02-18 PROCEDURE — 93010 ELECTROCARDIOGRAM REPORT: CPT

## 2024-02-18 PROCEDURE — 70450 CT HEAD/BRAIN W/O DYE: CPT | Mod: 26,MA,59

## 2024-02-18 PROCEDURE — 70498 CT ANGIOGRAPHY NECK: CPT | Mod: 26,MA

## 2024-02-18 PROCEDURE — 99291 CRITICAL CARE FIRST HOUR: CPT

## 2024-02-18 RX ORDER — ACETAMINOPHEN 500 MG
975 TABLET ORAL ONCE
Refills: 0 | Status: COMPLETED | OUTPATIENT
Start: 2024-02-18 | End: 2024-02-18

## 2024-02-18 RX ORDER — LIDOCAINE 4 G/100G
1 CREAM TOPICAL ONCE
Refills: 0 | Status: COMPLETED | OUTPATIENT
Start: 2024-02-18 | End: 2024-02-18

## 2024-02-18 RX ORDER — MECLIZINE HCL 12.5 MG
25 TABLET ORAL ONCE
Refills: 0 | Status: COMPLETED | OUTPATIENT
Start: 2024-02-18 | End: 2024-02-18

## 2024-02-18 RX ADMIN — Medication 975 MILLIGRAM(S): at 12:06

## 2024-02-18 RX ADMIN — Medication 25 MILLIGRAM(S): at 12:06

## 2024-02-18 RX ADMIN — LIDOCAINE 1 PATCH: 4 CREAM TOPICAL at 12:08

## 2024-02-18 NOTE — ED PROVIDER NOTE - ATTENDING CONTRIBUTION TO CARE
78 F with history of breast cancer but no active treatment, awoke in the middle of the night complaining of dizziness and weakness.  Patient had difficulty ambulating at that time.  Went back to bed with some improvement but when got out of bed this a.m. again felt weak and dizzy.  Had unsteady gait but denies N/V, no vision change, no weakness/numbness, no change in speech.  States had difficulty reading a book due to words on the page "moving."  No head injury or fall.  No use of blood thinners.  No history of previous CVA.  Patient to ED with stroke code activated to due to more continuous nature of symptoms.  Initial exam revealed unilateral horizontal–rotatory nystagmus.  Patient stated that the symptoms were constant but did wax and wane and were worsened by getting out of bed, walking, and head movements.  Neuro team to bedside and reviewing imaging.  MMM, Agnes, EOMI, no diplopia, L gaze nystagmus clear lungs, heart reg, abd soft, NT to palp, no tong/guarding, no CVAT, no edema, NT calves, CN 3-12 intact, nl gait at time of eval.

## 2024-02-18 NOTE — ED PROVIDER NOTE - CLINICAL SUMMARY MEDICAL DECISION MAKING FREE TEXT BOX
78F PMH as above here for dizziness since 1AM this morning, code stroke activated in ED. VSS, Pe as above. Will do CBC, CMP, troponin, VBG, coags, CTA head and neck, CT head to evaluate for acute intracranial abnormality such as CVA  And reassess.  Likely admit to CDU for MRI however dispo pending neuro recs and clinical course.

## 2024-02-18 NOTE — ED PROVIDER NOTE - NSFOLLOWUPINSTRUCTIONS_ED_ALL_ED_FT
Benign Positional Vertigo    Take Meclizine 25mg every 8 hours as needed for dizziness/vertigo symptoms.      Vertigo is the feeling that you or your surroundings are moving when they are not. Benign positional vertigo is the most common form of vertigo. The cause of this condition is not serious (is benign). This condition is triggered by certain movements and positions (is positional). This condition can be dangerous if it occurs while you are doing something that could endanger you or others, such as driving.    What are the causes?  In many cases, the cause of this condition is not known. It may be caused by a disturbance in an area of the inner ear that helps your brain to sense movement and balance. This disturbance can be caused by a viral infection (labyrinthitis), head injury, or repetitive motion.    What increases the risk?  This condition is more likely to develop in:  Women.  People who are 50 years of age or older.  What are the signs or symptoms?  Symptoms of this condition usually happen when you move your head or your eyes in different directions. Symptoms may start suddenly, and they usually last for less than a minute. Symptoms may include:  Loss of balance and falling.  Feeling like you are spinning or moving.  Feeling like your surroundings are spinning or moving.  Nausea and vomiting.  Blurred vision.  Dizziness.  Involuntary eye movement (nystagmus).  Symptoms can be mild and cause only slight annoyance, or they can be severe and interfere with daily life. Episodes of benign positional vertigo may return (recur) over time, and they may be triggered by certain movements. Symptoms may improve over time.    How is this diagnosed?  This condition is usually diagnosed by medical history and a physical exam of the head, neck, and ears. You may be referred to a health care provider who specializes in ear, nose, and throat (ENT) problems (otolaryngologist) or a provider who specializes in disorders of the nervous system (neurologist). You may have additional testing, including:  MRI.  A CT scan.  Eye movement tests. Your health care provider may ask you to change positions quickly while he or she watches you for symptoms of benign positional vertigo, such as nystagmus. Eye movement may be tested with an electronystagmogram (ENG), caloric stimulation, the Mary-Hallpike test, or the roll test.  An electroencephalogram (EEG). This records electrical activity in your brain.  Hearing tests.  How is this treated?  Usually, your health care provider will treat this by moving your head in specific positions to adjust your inner ear back to normal. Surgery may be needed in severe cases, but this is rare. In some cases, benign positional vertigo may resolve on its own in 2-4 weeks.    Follow these instructions at home:  Safety     Move slowly.  Avoid sudden body or head movements.  Avoid driving.  Avoid operating heavy machinery.  Avoid doing any tasks that would be dangerous to you or others if a vertigo episode would occur.  If you have trouble walking or keeping your balance, try using a cane for stability. If you feel dizzy or unstable, sit down right away.  Return to your normal activities as told by your health care provider. Ask your health care provider what activities are safe for you.  General instructions     Take over-the-counter and prescription medicines only as told by your health care provider.  Avoid certain positions or movements as told by your health care provider.  Drink enough fluid to keep your urine clear or pale yellow.  Keep all follow-up visits as told by your health care provider. This is important.    Contact a health care provider if:  You have a fever.  Your condition gets worse or you develop new symptoms.  Your family or friends notice any behavioral changes.  Your nausea or vomiting gets worse.  You have numbness or a “pins and needles” sensation.  Get help right away if:  You have difficulty speaking or moving.  You are always dizzy.  You faint.  You develop severe headaches.  You have weakness in your legs or arms.  You have changes in your hearing or vision.  You develop a stiff neck.  You develop sensitivity to light.

## 2024-02-18 NOTE — ED ADULT NURSE NOTE - OBJECTIVE STATEMENT
Patient presented to ED with a chief complaint of dizziness. Patient states that she woke up and felt as though the room was spinning. Denies n/v/d, weakness, fatigue, headache, numbness/ tingling. CODE STROKE INITIATED (See Flowsheet)    Patient is A/O x 4, NAD, skin intact, PERRLA, speech clear, neurologically intact with no deficits, strength b/l upper and lower extremties 5/5, sensation intact b/l upper and lower extremities, rate and rhythm regular S1 and S2 heard with no murmurs radial and pedal pulses present, capillary refill <3 , lungs clear b/l lobes throughout with no adventitious sounds or accessory muscle use, even and unlabored, abdomen soft and non-tender, bowel sounds heard x 4 quadrant, no edema noted. Safety maintained, bed in lowest position, call bell within reach, plan of care reviewed with patient , addressed all questions and concern, will continue to monitor patient.

## 2024-02-18 NOTE — ED PROVIDER NOTE - PATIENT PORTAL LINK FT
You can access the FollowMyHealth Patient Portal offered by [facility] by registering at the following website: [email or web address]. By joining [facility]’s FollowMyHealth portal, you will also be able to view your health information using other applications (apps) compatible with our system.

## 2024-02-18 NOTE — ED ADULT NURSE NOTE - NSFALLHARMRISKINTERV_ED_ALL_ED

## 2024-02-18 NOTE — CONSULT NOTE ADULT - ATTENDING COMMENTS
Vertigo, isolated.  Neuro ROS is otherwise negative. Improved with treatment.     No corrective saccade with head impulse test.  No nystagmus at rest.  No skew deviation.   Epsom-Hallpike - clockwise rotatory nystagmus with right ear down, mild vertigo.  Epley maneuver performed.   No ataxia - FNF, ADRIÁN, HKS  Gait - tentative but narrow based.     A/P  Ms. Michaels is a 77 yo woman with benign paroxysmal positional vertigo.  [facility] - [phone]  Outpatient ENT and neurology   Please provider educational resource: [email or web address]  Thank you

## 2024-02-18 NOTE — ED PROVIDER NOTE - PHYSICAL EXAMINATION
Gen: WDWN, NAD  HEENT: EOMI, no nasal discharge, mucous membranes moist  CV: RRR, +S1/S2, no M/R/G  Resp: CTAB, no W/R/R  GI: Abdomen soft non-distended, NTTP, no masses  MSK: No open wounds, no bruising, no LE edema  Neuro: CN2-12 grossly intact, A&Ox4, MS +5/5 in UE and LE BL, finger to nose smooth and rapid, gross sensation intact in UE and LE BL, gait stilted, negative rhomberg, negative pronator drift   Psych: appropriate mood, denies AH, VH, SI

## 2024-02-18 NOTE — ED PROVIDER NOTE - PROGRESS NOTE DETAILS
Lupe- Spoke with Neurology attending- patient to be discharged and follow up outpatient. Patient to be given ent and neurology f/u. Patient to be prescribed meclizine. Quorishy- Pt. with mild positive Trego-Hallpike in ED. No sx at rest in bed upon re-eval. Neurology attending at bedside, agrees w plan- patient to be discharged and follow up outpatient. Patient to be given ent and neurology f/u. Patient to be prescribed meclizine.

## 2024-02-18 NOTE — CONSULT NOTE ADULT - SUBJECTIVE AND OBJECTIVE BOX
Neurology - Consult Note    -  Spectra: [phone] ([facility]), [phone] ([facility])  -    HPI: Patient OCTAVIANO GIBBS is a 78y (1945) woman with a PMHx significant for breast cancer (in remission), HTN, who presents w/ CC of dizziness. Went to bed at 11:30pm last night. Woke up at 1am with inner sense of dizziness. Having difficulty walking as a result. The sensation is constant and does not appear to be affected by changes in head/body position. She denies unilateral numbness, weakness, nausea, vomiting, tinnitus, hearing loss, slurred speech. She notes chronic b/l blurry vision. Never had symptoms like this before. She is normally independent in ADLs and walks unassisted at home. Not on any blood thinners. No family history of neurological disease.      Review of Systems:    CONSTITUTIONAL: No fevers or chills  EYES AND ENT: No visual changes or no throat pain   NECK: No pain or stiffness  RESPIRATORY: No hemoptysis or shortness of breath  CARDIOVASCULAR: No chest pain or palpitations  GASTROINTESTINAL: No melena or hematochezia  GENITOURINARY: No dysuria or hematuria  NEUROLOGICAL: +As stated in HPI above  SKIN: No itching, burning, rashes, or lesions   All other review of systems is negative unless indicated above.    Allergies:  No Known Allergies      PMHx/PSHx/Family Hx: As above, otherwise see below   Hypertension    Malignant neoplasm of right female breast, unspecified estrogen receptor status, unspecified site of breast        Social Hx:  Lives alone  No smoking or alcohol    Medications:  MEDICATIONS  (STANDING):    MEDICATIONS  (PRN):      Vitals:  T(C): 36.7 (02-18-24 @ 10:53), Max: 36.7 (02-18-24 @ 10:53)  HR: 70 (02-18-24 @ 10:53) (70 - 70)  BP: 193/81 (02-18-24 @ 10:53) (193/81 - 193/81)  RR: 16 (02-18-24 @ 10:53) (16 - 16)  SpO2: 98% (02-18-24 @ 10:53) (98% - 98%)    Physical Examination:  General - NAD, pleasant elderly female  Cardiovascular - Peripheral pulses palpable, no edema  Eyes - Fundoscopy not performed due to safety precautions in the setting of the COVID-19 pandemic    Neurologic Exam:  Mental status - Awake, Alert, Oriented to person, place, and time. Speech fluent. Follows simple and complex commands. Attention/concentration, recent and remote memory (including registration and recall), and fund of knowledge intact    Cranial nerves - PERRLA, VFF, EOMI, face sensation (V1-V3) intact b/l, facial strength intact without asymmetry b/l, hearing intact b/l, palate with symmetric elevation, trapezius 5/5 strength b/l, tongue midline on protrusion    Motor - Normal bulk and tone throughout. No pronator drift. Moving all extremities anti-gravity    Sensation - Light touch intact throughout. No extinction to DSS    DTR's - INCOMPLETE    Coordination - Finger to Nose intact b/l. No tremors appreciated    Gait and station - ataxic wide-based gait, ambulating with standby assist    Labs:          CAPILLARY BLOOD GLUCOSE  109 (18 Feb 2024 11:13)      POCT Blood Glucose.: 109 mg/dL (18 Feb 2024 10:59)                    Radiology:     Neurology - Consult Note    -  Spectra: [phone] ([facility]), [phone] ([facility])  -    HPI: Patient OCTAVIANO GIBBS is a 78y (1945) woman with a PMHx significant for breast cancer (in remission), HTN, who presents w/ CC of dizziness. Went to bed at 11:30pm last night. Woke up at 1am with inner sense of dizziness. Having difficulty walking as a result. The sensation is constant and does not appear to be affected by changes in head/body position. She denies unilateral numbness, weakness, nausea, vomiting, tinnitus, hearing loss, slurred speech. She notes chronic b/l blurry vision. Never had symptoms like this before. She is normally independent in ADLs and walks unassisted at home. Not on any blood thinners. No family history of neurological disease.      Review of Systems:    CONSTITUTIONAL: No fevers or chills  EYES AND ENT: No visual changes or no throat pain   NECK: No pain or stiffness  RESPIRATORY: No hemoptysis or shortness of breath  CARDIOVASCULAR: No chest pain or palpitations  GASTROINTESTINAL: No melena or hematochezia  GENITOURINARY: No dysuria or hematuria  NEUROLOGICAL: +As stated in HPI above  SKIN: No itching, burning, rashes, or lesions   All other review of systems is negative unless indicated above.    Allergies:  No Known Allergies      PMHx/PSHx/Family Hx: As above, otherwise see below   Hypertension    Malignant neoplasm of right female breast, unspecified estrogen receptor status, unspecified site of breast        Social Hx:  Lives alone  No smoking or alcohol    Medications:  MEDICATIONS  (STANDING):    MEDICATIONS  (PRN):      Vitals:  T(C): 36.7 (02-18-24 @ 10:53), Max: 36.7 (02-18-24 @ 10:53)  HR: 70 (02-18-24 @ 10:53) (70 - 70)  BP: 193/81 (02-18-24 @ 10:53) (193/81 - 193/81)  RR: 16 (02-18-24 @ 10:53) (16 - 16)  SpO2: 98% (02-18-24 @ 10:53) (98% - 98%)    Physical Examination:  General - NAD, pleasant elderly female  Cardiovascular - Peripheral pulses palpable, no edema  Eyes - Fundoscopy not performed due to safety precautions in the setting of the COVID-19 pandemic    Neurologic Exam:  Mental status - Awake, Alert, Oriented to person, place, and time. Speech fluent. Follows simple and complex commands. Attention/concentration, recent and remote memory (including registration and recall), and fund of knowledge intact    Cranial nerves - PERRLA, VFF, EOMI, face sensation (V1-V3) intact b/l, facial strength intact without asymmetry b/l, hearing intact b/l, palate with symmetric elevation, trapezius 5/5 strength b/l, tongue midline on protrusion    Motor - Normal bulk and tone throughout. No pronator drift. 5/5 strength in b/l deltoids, biceps, triceps, hip flexors, knee extensors, ankle dorsi/plantarflexion    Sensation - Light touch intact throughout. No extinction to DSS    DTR's - 2+ reflexes in b/l biceps, triceps, brachioradialis; 1 in b/l patellar and Achilles    Coordination - Finger to Nose intact b/l. No tremors appreciated    Gait and station - ataxic wide-based gait, ambulating with standby assist    HINTS - corrective saccade present w/ R-head impulse testing; no vertical skew    [place] Hallpike - torsional R-beating nystagmus w/ head tilted in either direction    Labs:          CAPILLARY BLOOD GLUCOSE  109 (18 Feb 2024 11:13)      POCT Blood Glucose.: 109 mg/dL (18 Feb 2024 10:59)                    Radiology:  [facility] w/o contrast 2/18/24: no acute pathology (per radiology resident)   Neurology - Consult Note    -  Spectra: [phone] ([facility]), [phone] ([facility])  -    HPI: Patient OCTAVIANO GIBBS is a 78y (1945) woman with a PMHx significant for breast cancer (in remission), HTN, who presents w/ CC of dizziness. Went to bed at 11:30pm last night. Woke up at 1am with inner sense of dizziness. Having difficulty walking as a result. The sensation is constant and does not appear to be affected by changes in head/body position. She denies unilateral numbness, weakness, nausea, vomiting, tinnitus, hearing loss, slurred speech. She notes chronic b/l blurry vision. Never had symptoms like this before. She is normally independent in ADLs and walks unassisted at home. Not on any blood thinners. No family history of neurological disease.      Review of Systems:    CONSTITUTIONAL: No fevers or chills  EYES AND ENT: No visual changes or no throat pain   NECK: No pain or stiffness  RESPIRATORY: No hemoptysis or shortness of breath  CARDIOVASCULAR: No chest pain or palpitations  GASTROINTESTINAL: No melena or hematochezia  GENITOURINARY: No dysuria or hematuria  NEUROLOGICAL: +As stated in HPI above  SKIN: No itching, burning, rashes, or lesions   All other review of systems is negative unless indicated above.    Allergies:  No Known Allergies      PMHx/PSHx/Family Hx: As above, otherwise see below   Hypertension    Malignant neoplasm of right female breast, unspecified estrogen receptor status, unspecified site of breast        Social Hx:  Lives alone  No smoking or alcohol    Medications:  MEDICATIONS  (STANDING):    MEDICATIONS  (PRN):      Vitals:  T(C): 36.7 (02-18-24 @ 10:53), Max: 36.7 (02-18-24 @ 10:53)  HR: 70 (02-18-24 @ 10:53) (70 - 70)  BP: 193/81 (02-18-24 @ 10:53) (193/81 - 193/81)  RR: 16 (02-18-24 @ 10:53) (16 - 16)  SpO2: 98% (02-18-24 @ 10:53) (98% - 98%)    Physical Examination:  General - NAD, pleasant elderly female  Cardiovascular - Peripheral pulses palpable, no edema  Eyes - Fundoscopy not performed due to safety precautions in the setting of the COVID-19 pandemic    Neurologic Exam:  Mental status - Awake, Alert, Oriented to person, place, and time. Speech fluent. Follows simple and complex commands. Attention/concentration, recent and remote memory (including registration and recall), and fund of knowledge intact    Cranial nerves - PERRLA, VFF, EOMI, face sensation (V1-V3) intact b/l, facial strength intact without asymmetry b/l, hearing intact b/l, palate with symmetric elevation, trapezius 5/5 strength b/l, tongue midline on protrusion    Motor - Normal bulk and tone throughout. No pronator drift. 5/5 strength in b/l deltoids, biceps, triceps, hip flexors, knee extensors, ankle dorsi/plantarflexion    Sensation - Light touch intact throughout. No extinction to DSS    DTR's - 2+ reflexes in b/l biceps, triceps, brachioradialis; 1 in b/l patellar and Achilles    Coordination - Finger to Nose intact b/l. HKS - no ataxia.  No tremors appreciated    Gait and station - ataxic wide-based gait, ambulating with standby assist    HINTS - corrective saccade present w/ R-head impulse testing; no vertical skew    [place] Hallpike - torsional R-beating nystagmus w/ head tilted in either direction    Labs:          CAPILLARY BLOOD GLUCOSE  109 (18 Feb 2024 11:13)      POCT Blood Glucose.: 109 mg/dL (18 Feb 2024 10:59)                    Radiology:  [facility] w/o contrast 2/18/24: no acute pathology (per radiology resident)

## 2024-02-18 NOTE — ED ADULT TRIAGE NOTE - CHIEF COMPLAINT QUOTE
pt c/o feeling dizzy on off since last nite//. went to bed fine  woke up at 12 mid nite and felt dizzy  felt like her balance was off

## 2024-02-18 NOTE — ED PROVIDER NOTE - OBJECTIVE STATEMENT
78F PMH of HTN here for dizziness upon waking at 1AM this morning, states it feels like the room is spinning ar 78F breast cancer (in remission), HTN, who presents w/ CC of dizziness. Went to bed at 11:30pm last night. Woke up at 1AM with constant, nonpositional dizziness and chronic blurry vision and difficulty walking. She denies unilateral numbness, weakness, nausea, vomiting, tinnitus, hearing loss, slurred speechNot on any blood thinners. 78F breast cancer (in remission), HTN, who presents w/ CC of dizziness. Went to bed at 11:30pm last night. Woke up at 1AM with constant, dizziness and chronic blurry vision and difficulty walking. She denies unilateral numbness, weakness, nausea, vomiting, tinnitus, hearing loss, slurred speechNot on any blood thinners.

## 2024-02-18 NOTE — CONSULT NOTE ADULT - ASSESSMENT
78y F with Hx breast cancer, HTN, who presents w/ CC of dizziness    LKW: 11:30pm 2/17  NIHSS: 2 (ataxic gait)  MRS: 0  Not tenecteplase candidate due to outside of time window  Not thrombectomy candidate due to lack of LVO    Impression: vertigo accompanied by corrective saccade and positive [place] Hallpike, likely peripheral vertigo. However, would rule-out stroke    Recommendations:  [] start meclizine 12.5mg q6h PRN  [] start aspirin 81mg daily  [] check A1c, lipid panel  [] MR brain w/o contrast  [] TTE w/ bubble  [] telemetry  [] PT/OT/SLP evaluations    Case discussed w/ tele-stroke attending Dr. Marquez regarding thrombectomy/thrombolytics candidacy   78y F with Hx breast cancer, HTN, who presents w/ CC of dizziness    LKW: 11:30pm 2/17  NIHSS: 2 (ataxic gait)  MRS: 0  Not tenecteplase candidate due to outside of time window  Not thrombectomy candidate due to lack of LVO    Impression: vertigo accompanied by corrective saccade and positive [place] Hallpike, likely peripheral vertigo    Recommendations:  [] start meclizine 12.5mg q6h PRN  [] outpatient ENT evaluation  [] PT/OT/SLP evaluations when able    Case discussed w/ tele-stroke attending Dr. Marquez regarding thrombectomy/thrombolytics candidacy and also w/ general attending Dr. Chacon   78y F with Hx breast cancer, HTN, who presents w/ CC of dizziness    LKW: 11:30pm 2/17  NIHSS: 2 (ataxic gait)  MRS: 0  Not tenecteplase candidate due to outside of time window  Not thrombectomy candidate due to lack of LVO    Impression: vertigo accompanied by corrective saccade and positive [place] Hallpike, likely peripheral vertigo    Recommendations:  [] start meclizine 12.5mg q6h PRN  [] outpatient ENT evaluation and neurology f/u    Case discussed w/ tele-stroke attending Dr. Marquez regarding thrombectomy/thrombolytics candidacy and also w/ general attending Dr. Chacon

## 2025-04-11 ENCOUNTER — APPOINTMENT (OUTPATIENT)
Dept: ULTRASOUND IMAGING | Facility: IMAGING CENTER | Age: 80
End: 2025-04-11
Payer: MEDICARE

## 2025-04-11 ENCOUNTER — OUTPATIENT (OUTPATIENT)
Dept: OUTPATIENT SERVICES | Facility: HOSPITAL | Age: 80
LOS: 1 days | End: 2025-04-11
Payer: MEDICARE

## 2025-04-11 ENCOUNTER — APPOINTMENT (OUTPATIENT)
Dept: MAMMOGRAPHY | Facility: IMAGING CENTER | Age: 80
End: 2025-04-11
Payer: MEDICARE

## 2025-04-11 DIAGNOSIS — Z90.11 ACQUIRED ABSENCE OF RIGHT BREAST AND NIPPLE: Chronic | ICD-10-CM

## 2025-04-11 DIAGNOSIS — Z00.8 ENCOUNTER FOR OTHER GENERAL EXAMINATION: ICD-10-CM

## 2025-04-11 PROCEDURE — 77063 BREAST TOMOSYNTHESIS BI: CPT | Mod: 26,52

## 2025-04-11 PROCEDURE — 77067 SCR MAMMO BI INCL CAD: CPT | Mod: 26,LT,52

## 2025-04-11 PROCEDURE — 77067 SCR MAMMO BI INCL CAD: CPT

## 2025-04-11 PROCEDURE — 77063 BREAST TOMOSYNTHESIS BI: CPT
